# Patient Record
Sex: MALE | Race: WHITE | NOT HISPANIC OR LATINO | Employment: UNEMPLOYED | URBAN - METROPOLITAN AREA
[De-identification: names, ages, dates, MRNs, and addresses within clinical notes are randomized per-mention and may not be internally consistent; named-entity substitution may affect disease eponyms.]

---

## 2022-08-03 ENCOUNTER — TRANSFERRED RECORDS (OUTPATIENT)
Dept: HEALTH INFORMATION MANAGEMENT | Facility: CLINIC | Age: 32
End: 2022-08-03

## 2022-08-03 ENCOUNTER — MEDICAL CORRESPONDENCE (OUTPATIENT)
Dept: HEALTH INFORMATION MANAGEMENT | Facility: CLINIC | Age: 32
End: 2022-08-03

## 2022-08-08 ENCOUNTER — TELEPHONE (OUTPATIENT)
Dept: OPHTHALMOLOGY | Facility: CLINIC | Age: 32
End: 2022-08-08

## 2022-08-08 ENCOUNTER — TRANSCRIBE ORDERS (OUTPATIENT)
Dept: OTHER | Age: 32
End: 2022-08-08

## 2022-08-08 DIAGNOSIS — H53.19 OTHER SUBJECTIVE VISUAL DISTURBANCES: Primary | ICD-10-CM

## 2022-08-08 NOTE — TELEPHONE ENCOUNTER
Called and spoke to Vianey     Made him an appointment with Dr. Francis staffed with Dr. Pickett on 8/23 @ 1030 am     Bernadette Mead Communication Facilitator on 8/8/2022 at 3:08 PM

## 2022-08-22 ENCOUNTER — TELEPHONE (OUTPATIENT)
Dept: OPHTHALMOLOGY | Facility: CLINIC | Age: 32
End: 2022-08-22

## 2022-12-20 ENCOUNTER — TRANSFERRED RECORDS (OUTPATIENT)
Dept: HEALTH INFORMATION MANAGEMENT | Facility: CLINIC | Age: 32
End: 2022-12-20

## 2022-12-21 ENCOUNTER — OFFICE VISIT (OUTPATIENT)
Dept: OPHTHALMOLOGY | Facility: CLINIC | Age: 32
End: 2022-12-21
Attending: OPHTHALMOLOGY
Payer: COMMERCIAL

## 2022-12-21 DIAGNOSIS — Z87.898 H/O DIZZINESS: ICD-10-CM

## 2022-12-21 DIAGNOSIS — H53.19 OTHER SUBJECTIVE VISUAL DISTURBANCES: Primary | ICD-10-CM

## 2022-12-21 DIAGNOSIS — H52.203 MYOPIA OF BOTH EYES WITH ASTIGMATISM: ICD-10-CM

## 2022-12-21 DIAGNOSIS — F10.10 ALCOHOL ABUSE: ICD-10-CM

## 2022-12-21 DIAGNOSIS — H53.50 COLOR BLIND: ICD-10-CM

## 2022-12-21 DIAGNOSIS — H52.13 MYOPIA OF BOTH EYES WITH ASTIGMATISM: ICD-10-CM

## 2022-12-21 PROCEDURE — 92133 CPTRZD OPH DX IMG PST SGM ON: CPT | Performed by: OPHTHALMOLOGY

## 2022-12-21 PROCEDURE — G0463 HOSPITAL OUTPT CLINIC VISIT: HCPCS

## 2022-12-21 PROCEDURE — 92134 CPTRZ OPH DX IMG PST SGM RTA: CPT | Performed by: OPHTHALMOLOGY

## 2022-12-21 PROCEDURE — 92083 EXTENDED VISUAL FIELD XM: CPT | Performed by: OPHTHALMOLOGY

## 2022-12-21 PROCEDURE — 99204 OFFICE O/P NEW MOD 45 MIN: CPT | Performed by: OPHTHALMOLOGY

## 2022-12-21 RX ORDER — LORAZEPAM 1 MG/1
1 TABLET ORAL DAILY PRN
Status: ON HOLD | COMMUNITY
Start: 2022-10-07 | End: 2023-03-16

## 2022-12-21 RX ORDER — SENNOSIDES A AND B 8.6 MG/1
8.6 TABLET, FILM COATED ORAL PRN
COMMUNITY
Start: 2022-12-11 | End: 2023-03-12

## 2022-12-21 RX ORDER — FOLIC ACID 1 MG/1
1 TABLET ORAL DAILY
Qty: 30 TABLET | Refills: 11 | Status: ON HOLD | OUTPATIENT
Start: 2022-12-21 | End: 2023-03-16

## 2022-12-21 RX ORDER — FAMOTIDINE 20 MG/1
20 TABLET, FILM COATED ORAL DAILY
COMMUNITY
Start: 2022-12-11 | End: 2024-08-14

## 2022-12-21 RX ORDER — LANOLIN ALCOHOL/MO/W.PET/CERES
1 CREAM (GRAM) TOPICAL AT BEDTIME
COMMUNITY
Start: 2022-12-11 | End: 2023-03-12

## 2022-12-21 RX ORDER — FLUOXETINE 40 MG/1
40 CAPSULE ORAL DAILY
COMMUNITY
Start: 2022-12-11 | End: 2024-08-14

## 2022-12-21 RX ORDER — LANOLIN ALCOHOL/MO/W.PET/CERES
100 CREAM (GRAM) TOPICAL DAILY
Status: ON HOLD | COMMUNITY
Start: 2022-12-12 | End: 2023-03-16

## 2022-12-21 ASSESSMENT — SLIT LAMP EXAM - LIDS
COMMENTS: NORMAL
COMMENTS: NORMAL

## 2022-12-21 ASSESSMENT — VISUAL ACUITY
OS_CC: J2
OD_PH_CC: 20/50
OS_CC+: -1
OD_CC+: -1
OD_PH_CC+: +2
OD_CC: 20/50
OS_CC: 20/40
OD_CC: J2
METHOD: SNELLEN - LINEAR

## 2022-12-21 ASSESSMENT — REFRACTION_MANIFEST
OS_AXIS: 180
OD_AXIS: 014
OS_SPHERE: -4.75
OD_SPHERE: -4.25
OS_CYLINDER: +1.75
OD_CYLINDER: +1.25

## 2022-12-21 ASSESSMENT — CONF VISUAL FIELD
OS_INFERIOR_TEMPORAL_RESTRICTION: 0
METHOD: COUNTING FINGERS
OD_INFERIOR_TEMPORAL_RESTRICTION: 0
OS_NORMAL: 1
OS_INFERIOR_NASAL_RESTRICTION: 0
OD_SUPERIOR_NASAL_RESTRICTION: 0
OS_SUPERIOR_TEMPORAL_RESTRICTION: 0
OD_SUPERIOR_TEMPORAL_RESTRICTION: 0
OD_NORMAL: 1
OD_INFERIOR_NASAL_RESTRICTION: 0
OS_SUPERIOR_NASAL_RESTRICTION: 0

## 2022-12-21 ASSESSMENT — REFRACTION_WEARINGRX
OS_SPHERE: -5.50
SPECS_TYPE: SVL
OS_AXIS: 180
OS_CYLINDER: +2.00
OD_AXIS: 018
OD_SPHERE: -4.50
OD_CYLINDER: +1.25

## 2022-12-21 ASSESSMENT — TONOMETRY
OS_IOP_MMHG: 15
OD_IOP_MMHG: 17
IOP_METHOD: TONOPEN

## 2022-12-21 ASSESSMENT — CUP TO DISC RATIO
OD_RATIO: 0.35
OS_RATIO: 0.5

## 2022-12-21 NOTE — PROGRESS NOTES
"HPI     Vision Changes Ou    In both eyes.  Onset was gradual.  Severity is mild.  Occurring constantly.  Associated symptoms include dryness and photophobia.  Negative for eye pain, tearing, flashes, floaters, itching and burning.  Pain was noted as 0/10.           Comments    Vianey is here referred by Dr Anna for subjective field disturbance. Vianey states his vision is blurry all the time, but sometimes clears some. He says this started last summer.     Mann Pinead COT 2:13 PM December 21, 2022     Glasses help vision but noticing vision \"coming in and out of focus\".  He states that overall his vision is decreasing. He has had issues with balance for approximately 2 years.    He states that he has intermittent inability to raise his arms.    He has a history of anxiety. Has referral for psychiatrist.     Last EtOH prior to 11/30. Prior to admission. 2 alcoholic drinks per day-liquor or beer. Not homemade.     No restrictive diet.   Denies spots in vision.     Taking thiamine supplements since admission.               Last edited by Juan Luis Lang MD on 12/21/2022  2:52 PM.         Review of systems for the eyes was negative other than the pertinent positives/negatives listed in the HPI.      Assessment & Plan    HPI:  Vianey Whitaker is a 32 year old male with history of EtOH abuse and withdrawal seizures with recent inpatient hospitalization at [facility] [place] 11/30/22-12/11/22, myopia with astigmatism presents with 6 month history of decreased vision from Dr. Jesús Anna O.D. at [facility]. Initially noted decreased vision in August and was referred to [facility] Best corrected visual acuity at that time 20/50 right eye, 20/40 left eye.  He has noted 2 years of balance issues with intermittent inability to raise his arms. He denies a restrictive diet. Denies diplopia. Denies TVOs.     Since recent hospitalization, he is taking thiamine 100mg.      POHx: myopia with astigmatism and presbyopia  PMHx: EtOH abuse " with withdrawal seizure  Current Medications: famotidine (PEPCID) 20 MG tablet, Take 20 mg by mouth daily  FLUoxetine (PROZAC) 20 MG capsule, Take 1 capsule by mouth daily  LORazepam (ATIVAN) 1 MG tablet, Take 1 tablet by mouth daily as needed  melatonin 3 MG tablet, Take 1 tablet by mouth At Bedtime  senna (SENOKOT) 8.6 MG tablet, Take 8.6 mg by mouth as needed  thiamine (B-1) 100 MG tablet, Take 100 mg by mouth daily    No current facility-administered medications on file prior to visit.    FHx: denies family history of ocular conditions  PSHx: denies history of ocular surgeries     CT Head 11/30/22 [facility] [place]  Findings: Small benign sebaceous cyst is seen overlying the right frontal scalp. There is no evidence of intracranial hemorrhage, mass effect, midline shift or abnormal extraaxial fluid collection. The ventricles do not appear enlarged out of proportion to the cerebral sulci. Gray-white differentiation is intact throughout both cerebral hemispheres. The orbits appear unremarkable. Moderate cerebellar volume loss. Diffuse cerebral volume loss. Low-attenuation the ventral sy, could be related to chronic small vessel ischemic disease, however it is partially obscured by significant artifact.     The bony calvarium and the bones of the skull base appear normal. The visualized portions of the paranasal sinuses and the mastoid air cells are clear. Chronic nasal septal deformity.    Folate serum 2/17/22 3.1 (LOW)  Vitamin B12 2/17/22 1118 (HIGH)    Current Eye Medications:      Assessment & Plan:  (H53.19) Other subjective visual disturbances  (primary encounter diagnosis)  (F10.10) Alcohol abuse  Best corrected visual acuity 20/40 consistent with [place] best corrected visual acuity from 6/2022, consistent with 20/50+ right eye, 20/40 left eye today    History of withdrawal seizure and hospital admission at [facility] [place] 11/30/2022-12/11/22  CT head at the time normal  History of low folic acid 2/2022  Pentacam with mildly  irregular astigmatism both eyes but not blatant Keratoconus or PMD  OCT with temporal thinning each eye consistent with possible nutritional deficiency    Will order MRI Brain/orbits with and without contrast; optic neuropathy labs including thiamine, B12/folate, FTA/RPR, quantiferon, heavy metal screen; can consider further work up thereafter  - will refer to neuro-ophthalmology for further eval after testing completed    - will supplement with 1mg folic acid  - continue thiamine supplementation 100mg daily    H/O dizziness  With cerebellar volume loss on CT  Will refer to neurology    (H53.50) Color blind  Color plates with 1/14 right eye, 0/14 left eye   Patient should be able to complete baseline color plate left eye with vision and color blind    (H52.13,  H52.203) Myopia of both eyes with astigmatism  MRx today similar     Return for neuro op 1-2 months.        Juan Luis Lang MD     Attending Physician Attestation:  Complete documentation of historical and exam elements from today's encounter can be found in the full encounter summary report (not reduplicated in this progress note).  I personally obtained the chief complaint(s) and history of present illness.  I confirmed and edited as necessary the review of systems, past medical/surgical history, family history, social history, and examination findings as documented by others; and I examined the patient myself.  I personally reviewed the relevant tests, images, and reports as documented above.  I formulated and edited as necessary the assessment and plan and discussed the findings and management plan with the patient and family. - Juan Luis Lang MD

## 2022-12-21 NOTE — NURSING NOTE
Chief Complaints and History of Present Illnesses   Patient presents with     Vision Changes Ou     Chief Complaint(s) and History of Present Illness(es)     Vision Changes Ou            Laterality: both eyes    Onset: gradual    Severity: mild    Frequency: constantly    Associated symptoms: dryness and photophobia.  Negative for eye pain, tearing, flashes, floaters, itching and burning    Pain scale: 0/10          Comments    Vianey is here referred by Dr Anna for subjective field disturbance. Vianey states his vision is blurry all the time, but sometimes clears some. He says this started last summer.    Mann Pineda COT 2:13 PM December 21, 2022

## 2022-12-26 ENCOUNTER — LAB (OUTPATIENT)
Dept: LAB | Facility: CLINIC | Age: 32
End: 2022-12-26
Payer: COMMERCIAL

## 2022-12-26 DIAGNOSIS — H53.19 OTHER SUBJECTIVE VISUAL DISTURBANCES: ICD-10-CM

## 2022-12-26 DIAGNOSIS — F10.10 ALCOHOL ABUSE: ICD-10-CM

## 2022-12-26 LAB — T PALLIDUM AB SER QL: NONREACTIVE

## 2022-12-26 PROCEDURE — 86780 TREPONEMA PALLIDUM: CPT | Mod: 90 | Performed by: PATHOLOGY

## 2022-12-26 PROCEDURE — 84425 ASSAY OF VITAMIN B-1: CPT | Mod: 90 | Performed by: PATHOLOGY

## 2022-12-26 PROCEDURE — 99000 SPECIMEN HANDLING OFFICE-LAB: CPT | Performed by: PATHOLOGY

## 2022-12-26 PROCEDURE — 36415 COLL VENOUS BLD VENIPUNCTURE: CPT | Performed by: PATHOLOGY

## 2022-12-26 PROCEDURE — 86481 TB AG RESPONSE T-CELL SUSP: CPT | Mod: 90 | Performed by: PATHOLOGY

## 2022-12-27 LAB
GAMMA INTERFERON BACKGROUND BLD IA-ACNC: 0.03 IU/ML
M TB IFN-G BLD-IMP: NEGATIVE
M TB IFN-G CD4+ BCKGRND COR BLD-ACNC: 9.97 IU/ML
MITOGEN IGNF BCKGRD COR BLD-ACNC: -0.01 IU/ML
MITOGEN IGNF BCKGRD COR BLD-ACNC: -0.01 IU/ML
QUANTIFERON MITOGEN: 10 IU/ML
QUANTIFERON NIL TUBE: 0.03 IU/ML
QUANTIFERON TB1 TUBE: 0.02 IU/ML
QUANTIFERON TB2 TUBE: 0.02

## 2023-01-02 LAB — VIT B1 PYROPHOSHATE BLD-SCNC: 200 NMOL/L

## 2023-01-03 ENCOUNTER — TRANSCRIBE ORDERS (OUTPATIENT)
Dept: OTHER | Age: 33
End: 2023-01-03

## 2023-01-03 DIAGNOSIS — J69.0 ASPIRATION PNEUMONIA (H): ICD-10-CM

## 2023-01-03 DIAGNOSIS — Z09 HOSPITAL DISCHARGE FOLLOW-UP: ICD-10-CM

## 2023-01-03 DIAGNOSIS — J02.9 PHARYNGITIS: ICD-10-CM

## 2023-01-03 DIAGNOSIS — R13.10 DYSPHAGIA: Primary | ICD-10-CM

## 2023-03-12 ENCOUNTER — APPOINTMENT (OUTPATIENT)
Dept: CT IMAGING | Facility: CLINIC | Age: 33
End: 2023-03-12
Attending: PHYSICIAN ASSISTANT
Payer: COMMERCIAL

## 2023-03-12 ENCOUNTER — HOSPITAL ENCOUNTER (INPATIENT)
Facility: CLINIC | Age: 33
LOS: 4 days | Discharge: HOME | End: 2023-03-16
Attending: EMERGENCY MEDICINE | Admitting: INTERNAL MEDICINE
Payer: COMMERCIAL

## 2023-03-12 DIAGNOSIS — F10.939 ALCOHOL WITHDRAWAL SYNDROME WITH COMPLICATION (H): ICD-10-CM

## 2023-03-12 DIAGNOSIS — R56.9 SEIZURES (H): ICD-10-CM

## 2023-03-12 DIAGNOSIS — F32.A DEPRESSION, UNSPECIFIED DEPRESSION TYPE: ICD-10-CM

## 2023-03-12 DIAGNOSIS — R74.01 TRANSAMINITIS: Primary | ICD-10-CM

## 2023-03-12 LAB
ALBUMIN SERPL BCG-MCNC: 4.2 G/DL (ref 3.5–5.2)
ALP SERPL-CCNC: 63 U/L (ref 40–129)
ALT SERPL W P-5'-P-CCNC: 75 U/L (ref 10–50)
ANION GAP SERPL CALCULATED.3IONS-SCNC: 14 MMOL/L (ref 7–15)
AST SERPL W P-5'-P-CCNC: 127 U/L (ref 10–50)
BASOPHILS # BLD AUTO: 0.1 10E3/UL (ref 0–0.2)
BASOPHILS NFR BLD AUTO: 1 %
BILIRUB SERPL-MCNC: 1.4 MG/DL
BUN SERPL-MCNC: 6.8 MG/DL (ref 6–20)
CALCIUM SERPL-MCNC: 9.3 MG/DL (ref 8.6–10)
CHLORIDE SERPL-SCNC: 94 MMOL/L (ref 98–107)
CREAT SERPL-MCNC: 0.62 MG/DL (ref 0.67–1.17)
DEPRECATED HCO3 PLAS-SCNC: 27 MMOL/L (ref 22–29)
EOSINOPHIL # BLD AUTO: 0 10E3/UL (ref 0–0.7)
EOSINOPHIL NFR BLD AUTO: 0 %
ERYTHROCYTE [DISTWIDTH] IN BLOOD BY AUTOMATED COUNT: 12.3 % (ref 10–15)
GFR SERPL CREATININE-BSD FRML MDRD: >90 ML/MIN/1.73M2
GLUCOSE SERPL-MCNC: 186 MG/DL (ref 70–99)
HCT VFR BLD AUTO: 40 % (ref 40–53)
HGB BLD-MCNC: 13.9 G/DL (ref 13.3–17.7)
HOLD SPECIMEN: NORMAL
HOLD SPECIMEN: NORMAL
IMM GRANULOCYTES # BLD: 0 10E3/UL
IMM GRANULOCYTES NFR BLD: 1 %
INR PPP: 1.09 (ref 0.85–1.15)
LYMPHOCYTES # BLD AUTO: 0.2 10E3/UL (ref 0.8–5.3)
LYMPHOCYTES NFR BLD AUTO: 4 %
MAGNESIUM SERPL-MCNC: 1.2 MG/DL (ref 1.7–2.3)
MCH RBC QN AUTO: 31.4 PG (ref 26.5–33)
MCHC RBC AUTO-ENTMCNC: 34.8 G/DL (ref 31.5–36.5)
MCV RBC AUTO: 91 FL (ref 78–100)
MONOCYTES # BLD AUTO: 0.5 10E3/UL (ref 0–1.3)
MONOCYTES NFR BLD AUTO: 11 %
NEUTROPHILS # BLD AUTO: 3.5 10E3/UL (ref 1.6–8.3)
NEUTROPHILS NFR BLD AUTO: 83 %
NRBC # BLD AUTO: 0 10E3/UL
NRBC BLD AUTO-RTO: 0 /100
PHOSPHATE SERPL-MCNC: 1.3 MG/DL (ref 2.5–4.5)
PLATELET # BLD AUTO: 183 10E3/UL (ref 150–450)
POTASSIUM SERPL-SCNC: 3.2 MMOL/L (ref 3.4–5.3)
PROT SERPL-MCNC: 6.8 G/DL (ref 6.4–8.3)
RBC # BLD AUTO: 4.42 10E6/UL (ref 4.4–5.9)
SODIUM SERPL-SCNC: 135 MMOL/L (ref 136–145)
WBC # BLD AUTO: 4.3 10E3/UL (ref 4–11)

## 2023-03-12 PROCEDURE — 70450 CT HEAD/BRAIN W/O DYE: CPT

## 2023-03-12 PROCEDURE — 258N000003 HC RX IP 258 OP 636: Performed by: EMERGENCY MEDICINE

## 2023-03-12 PROCEDURE — 36415 COLL VENOUS BLD VENIPUNCTURE: CPT | Performed by: INTERNAL MEDICINE

## 2023-03-12 PROCEDURE — HZ2ZZZZ DETOXIFICATION SERVICES FOR SUBSTANCE ABUSE TREATMENT: ICD-10-PCS | Performed by: INTERNAL MEDICINE

## 2023-03-12 PROCEDURE — 250N000011 HC RX IP 250 OP 636: Performed by: EMERGENCY MEDICINE

## 2023-03-12 PROCEDURE — 84100 ASSAY OF PHOSPHORUS: CPT | Performed by: INTERNAL MEDICINE

## 2023-03-12 PROCEDURE — 83735 ASSAY OF MAGNESIUM: CPT | Performed by: PHYSICIAN ASSISTANT

## 2023-03-12 PROCEDURE — 85610 PROTHROMBIN TIME: CPT | Performed by: INTERNAL MEDICINE

## 2023-03-12 PROCEDURE — 93010 ELECTROCARDIOGRAM REPORT: CPT | Performed by: EMERGENCY MEDICINE

## 2023-03-12 PROCEDURE — 99285 EMERGENCY DEPT VISIT HI MDM: CPT | Mod: 25 | Performed by: EMERGENCY MEDICINE

## 2023-03-12 PROCEDURE — 250N000013 HC RX MED GY IP 250 OP 250 PS 637: Performed by: INTERNAL MEDICINE

## 2023-03-12 PROCEDURE — 250N000013 HC RX MED GY IP 250 OP 250 PS 637: Performed by: EMERGENCY MEDICINE

## 2023-03-12 PROCEDURE — 80053 COMPREHEN METABOLIC PANEL: CPT | Performed by: PHYSICIAN ASSISTANT

## 2023-03-12 PROCEDURE — 93005 ELECTROCARDIOGRAM TRACING: CPT | Performed by: EMERGENCY MEDICINE

## 2023-03-12 PROCEDURE — 36415 COLL VENOUS BLD VENIPUNCTURE: CPT | Performed by: EMERGENCY MEDICINE

## 2023-03-12 PROCEDURE — 250N000011 HC RX IP 250 OP 636: Performed by: INTERNAL MEDICINE

## 2023-03-12 PROCEDURE — 99223 1ST HOSP IP/OBS HIGH 75: CPT | Mod: AI | Performed by: INTERNAL MEDICINE

## 2023-03-12 PROCEDURE — 96361 HYDRATE IV INFUSION ADD-ON: CPT | Performed by: EMERGENCY MEDICINE

## 2023-03-12 PROCEDURE — 72125 CT NECK SPINE W/O DYE: CPT

## 2023-03-12 PROCEDURE — 258N000003 HC RX IP 258 OP 636: Performed by: INTERNAL MEDICINE

## 2023-03-12 PROCEDURE — 85025 COMPLETE CBC W/AUTO DIFF WBC: CPT | Performed by: EMERGENCY MEDICINE

## 2023-03-12 PROCEDURE — 120N000002 HC R&B MED SURG/OB UMMC

## 2023-03-12 PROCEDURE — 96376 TX/PRO/DX INJ SAME DRUG ADON: CPT | Performed by: EMERGENCY MEDICINE

## 2023-03-12 PROCEDURE — 96374 THER/PROPH/DIAG INJ IV PUSH: CPT | Performed by: EMERGENCY MEDICINE

## 2023-03-12 RX ORDER — LORAZEPAM 2 MG/ML
1 INJECTION INTRAMUSCULAR ONCE
Status: COMPLETED | OUTPATIENT
Start: 2023-03-12 | End: 2023-03-12

## 2023-03-12 RX ORDER — MAGNESIUM OXIDE 400 MG/1
400 TABLET ORAL DAILY
Status: DISCONTINUED | OUTPATIENT
Start: 2023-03-12 | End: 2023-03-12

## 2023-03-12 RX ORDER — MAGNESIUM SULFATE HEPTAHYDRATE 40 MG/ML
2 INJECTION, SOLUTION INTRAVENOUS ONCE
Status: COMPLETED | OUTPATIENT
Start: 2023-03-12 | End: 2023-03-12

## 2023-03-12 RX ORDER — HALOPERIDOL 5 MG/ML
2.5-5 INJECTION INTRAMUSCULAR EVERY 6 HOURS PRN
Status: DISCONTINUED | OUTPATIENT
Start: 2023-03-12 | End: 2023-03-16 | Stop reason: HOSPADM

## 2023-03-12 RX ORDER — MAGNESIUM OXIDE 400 MG/1
400 TABLET ORAL 2 TIMES DAILY
Status: DISCONTINUED | OUTPATIENT
Start: 2023-03-12 | End: 2023-03-16 | Stop reason: HOSPADM

## 2023-03-12 RX ORDER — GABAPENTIN 300 MG/1
300 CAPSULE ORAL EVERY 8 HOURS
Status: DISCONTINUED | OUTPATIENT
Start: 2023-03-17 | End: 2023-03-16 | Stop reason: HOSPADM

## 2023-03-12 RX ORDER — LORAZEPAM 2 MG/ML
2 INJECTION INTRAMUSCULAR ONCE
Status: COMPLETED | OUTPATIENT
Start: 2023-03-12 | End: 2023-03-12

## 2023-03-12 RX ORDER — DIAZEPAM 10 MG
10 TABLET ORAL EVERY 30 MIN PRN
Status: DISCONTINUED | OUTPATIENT
Start: 2023-03-12 | End: 2023-03-16 | Stop reason: HOSPADM

## 2023-03-12 RX ORDER — DIAZEPAM 5 MG
5-20 TABLET ORAL EVERY 30 MIN PRN
Status: DISCONTINUED | OUTPATIENT
Start: 2023-03-12 | End: 2023-03-12

## 2023-03-12 RX ORDER — FOLIC ACID 1 MG/1
1 TABLET ORAL DAILY
Status: DISCONTINUED | OUTPATIENT
Start: 2023-03-13 | End: 2023-03-16 | Stop reason: HOSPADM

## 2023-03-12 RX ORDER — POTASSIUM CHLORIDE 1.5 G/1.58G
40 POWDER, FOR SOLUTION ORAL ONCE
Status: COMPLETED | OUTPATIENT
Start: 2023-03-12 | End: 2023-03-12

## 2023-03-12 RX ORDER — MULTIPLE VITAMINS W/ MINERALS TAB 9MG-400MCG
1 TAB ORAL DAILY
Status: DISCONTINUED | OUTPATIENT
Start: 2023-03-12 | End: 2023-03-16 | Stop reason: HOSPADM

## 2023-03-12 RX ORDER — LOPERAMIDE HCL 2 MG
2 CAPSULE ORAL 4 TIMES DAILY PRN
Status: DISCONTINUED | OUTPATIENT
Start: 2023-03-12 | End: 2023-03-16 | Stop reason: HOSPADM

## 2023-03-12 RX ORDER — POTASSIUM CHLORIDE 750 MG/1
40 TABLET, EXTENDED RELEASE ORAL 3 TIMES DAILY
Status: COMPLETED | OUTPATIENT
Start: 2023-03-12 | End: 2023-03-13

## 2023-03-12 RX ORDER — GABAPENTIN 300 MG/1
600 CAPSULE ORAL EVERY 8 HOURS
Status: DISCONTINUED | OUTPATIENT
Start: 2023-03-15 | End: 2023-03-16 | Stop reason: HOSPADM

## 2023-03-12 RX ORDER — GABAPENTIN 600 MG/1
1200 TABLET ORAL ONCE
Status: COMPLETED | OUTPATIENT
Start: 2023-03-12 | End: 2023-03-12

## 2023-03-12 RX ORDER — NALTREXONE HYDROCHLORIDE 50 MG/1
50 TABLET, FILM COATED ORAL DAILY
Status: ON HOLD | COMMUNITY
End: 2023-03-16

## 2023-03-12 RX ORDER — DIAZEPAM 10 MG/2ML
5-10 INJECTION, SOLUTION INTRAMUSCULAR; INTRAVENOUS EVERY 30 MIN PRN
Status: DISCONTINUED | OUTPATIENT
Start: 2023-03-12 | End: 2023-03-16 | Stop reason: HOSPADM

## 2023-03-12 RX ORDER — FLUMAZENIL 0.1 MG/ML
0.2 INJECTION, SOLUTION INTRAVENOUS
Status: DISCONTINUED | OUTPATIENT
Start: 2023-03-12 | End: 2023-03-16 | Stop reason: HOSPADM

## 2023-03-12 RX ORDER — CLONIDINE HYDROCHLORIDE 0.1 MG/1
0.1 TABLET ORAL EVERY 8 HOURS
Status: DISCONTINUED | OUTPATIENT
Start: 2023-03-12 | End: 2023-03-16 | Stop reason: HOSPADM

## 2023-03-12 RX ORDER — SODIUM CHLORIDE 9 MG/ML
INJECTION, SOLUTION INTRAVENOUS
Status: COMPLETED
Start: 2023-03-12 | End: 2023-03-12

## 2023-03-12 RX ORDER — GABAPENTIN 300 MG/1
900 CAPSULE ORAL EVERY 8 HOURS
Status: COMPLETED | OUTPATIENT
Start: 2023-03-12 | End: 2023-03-15

## 2023-03-12 RX ORDER — GABAPENTIN 100 MG/1
100 CAPSULE ORAL EVERY 8 HOURS
Status: DISCONTINUED | OUTPATIENT
Start: 2023-03-19 | End: 2023-03-16 | Stop reason: HOSPADM

## 2023-03-12 RX ORDER — SODIUM CHLORIDE 9 MG/ML
INJECTION, SOLUTION INTRAVENOUS CONTINUOUS
Status: DISCONTINUED | OUTPATIENT
Start: 2023-03-12 | End: 2023-03-14

## 2023-03-12 RX ORDER — OLANZAPINE 5 MG/1
5-10 TABLET, ORALLY DISINTEGRATING ORAL EVERY 6 HOURS PRN
Status: DISCONTINUED | OUTPATIENT
Start: 2023-03-12 | End: 2023-03-16 | Stop reason: HOSPADM

## 2023-03-12 RX ADMIN — POTASSIUM CHLORIDE 40 MEQ: 1.5 POWDER, FOR SOLUTION ORAL at 15:36

## 2023-03-12 RX ADMIN — FOLIC ACID: 5 INJECTION, SOLUTION INTRAMUSCULAR; INTRAVENOUS; SUBCUTANEOUS at 15:56

## 2023-03-12 RX ADMIN — LORAZEPAM 1 MG: 2 INJECTION INTRAMUSCULAR at 14:53

## 2023-03-12 RX ADMIN — LORAZEPAM 2 MG: 2 INJECTION INTRAMUSCULAR at 13:55

## 2023-03-12 RX ADMIN — SODIUM CHLORIDE 1000 ML: 9 INJECTION, SOLUTION INTRAVENOUS at 13:55

## 2023-03-12 RX ADMIN — GABAPENTIN 1200 MG: 600 TABLET, FILM COATED ORAL at 15:36

## 2023-03-12 RX ADMIN — CLONIDINE HYDROCHLORIDE 0.1 MG: 0.1 TABLET ORAL at 23:48

## 2023-03-12 RX ADMIN — GABAPENTIN 900 MG: 300 CAPSULE ORAL at 23:48

## 2023-03-12 RX ADMIN — SODIUM CHLORIDE, PRESERVATIVE FREE: 5 INJECTION INTRAVENOUS at 21:19

## 2023-03-12 RX ADMIN — MULTIPLE VITAMINS W/ MINERALS TAB 1 TABLET: TAB at 16:30

## 2023-03-12 RX ADMIN — CLONIDINE HYDROCHLORIDE 0.1 MG: 0.1 TABLET ORAL at 16:30

## 2023-03-12 RX ADMIN — DIAZEPAM 10 MG: 10 TABLET ORAL at 17:19

## 2023-03-12 RX ADMIN — MAGNESIUM OXIDE TAB 400 MG (241.3 MG ELEMENTAL MG) 400 MG: 400 (241.3 MG) TAB at 20:02

## 2023-03-12 RX ADMIN — MAGNESIUM OXIDE TAB 400 MG (241.3 MG ELEMENTAL MG) 400 MG: 400 (241.3 MG) TAB at 17:21

## 2023-03-12 RX ADMIN — DIBASIC SODIUM PHOSPHATE, MONOBASIC POTASSIUM PHOSPHATE AND MONOBASIC SODIUM PHOSPHATE 250 MG: 852; 155; 130 TABLET ORAL at 20:03

## 2023-03-12 RX ADMIN — MAGNESIUM SULFATE 2 G: 2 INJECTION INTRAVENOUS at 15:58

## 2023-03-12 RX ADMIN — POTASSIUM CHLORIDE 40 MEQ: 750 TABLET, EXTENDED RELEASE ORAL at 20:02

## 2023-03-12 RX ADMIN — DIAZEPAM 10 MG: 5 TABLET ORAL at 13:42

## 2023-03-12 ASSESSMENT — ACTIVITIES OF DAILY LIVING (ADL)
DRESSING/BATHING_DIFFICULTY: YES
NUMBER_OF_TIMES_PATIENT_HAS_FALLEN_WITHIN_LAST_SIX_MONTHS: 4
TRANSFERRING: 0-->ASSISTANCE NEEDED (DEVELOPMENTALLY APPROPRIATE)
TRANSFERRING: 1-->ASSISTANCE (EQUIPMENT/PERSON) NEEDED
BATHING: 1-->ASSISTANCE NEEDED
FALL_HISTORY_WITHIN_LAST_SIX_MONTHS: YES
TOILETING_ISSUES: YES
ADLS_ACUITY_SCORE: 36
WEAR_GLASSES_OR_BLIND: YES
ADLS_ACUITY_SCORE: 35
DOING_ERRANDS_INDEPENDENTLY_DIFFICULTY: NO
CONCENTRATING,_REMEMBERING_OR_MAKING_DECISIONS_DIFFICULTY: YES
EQUIPMENT_CURRENTLY_USED_AT_HOME: WALKER, STANDARD
DRESS: 0-->ASSISTANCE NEEDED (DEVELOPMENTALLY APPROPRIATE)
ADLS_ACUITY_SCORE: 36
VISION_MANAGEMENT: PRESCRIPTION GLASSES
TOILETING: 0-->NOT TOILET TRAINED OR ASSISTANCE NEEDED (DEVELOPMENTALLY APPROPRIATE)
CHANGE_IN_FUNCTIONAL_STATUS_SINCE_ONSET_OF_CURRENT_ILLNESS/INJURY: YES
ADLS_ACUITY_SCORE: 34
ADLS_ACUITY_SCORE: 35
WALKING_OR_CLIMBING_STAIRS_DIFFICULTY: YES
DRESSING/BATHING: BATHING DIFFICULTY, ASSISTANCE 1 PERSON
DIFFICULTY_EATING/SWALLOWING: NO
TOILETING: 1-->ASSISTANCE (EQUIPMENT/PERSON) NEEDED
DRESS: 1-->ASSISTANCE (EQUIPMENT/PERSON) NEEDED

## 2023-03-12 NOTE — PHARMACY-ADMISSION MEDICATION HISTORY
"Admission medication history interview status for the 3/12/2023 admission is complete. See EPIC admission navigator for allergy information, pharmacy, prior to admission medications and immunization status.     Medication history interview sources:  patient, fill history    Changes made to PTA medication list (reason)  Added: naltrexone  Deleted: melatonin, senna  Changed: fluoxetine 20mg--40mg    Additional medication history information (including reliability of information, actions taken by pharmacist): Aside from famotidine, rest of meds have not been taken since \"last week\".     Medication history completed by: Melissa Berry, PharmD, MPH, MS      Prior to Admission medications    Medication Sig Last Dose Taking? Auth Provider Long Term End Date   famotidine (PEPCID) 20 MG tablet Take 20 mg by mouth daily 3/11/2023 Yes Reported, Patient     FLUoxetine (PROZAC) 40 MG capsule Take 40 mg by mouth daily Past Week Yes Reported, Patient Yes    folic acid (FOLVITE) 1 MG tablet Take 1 tablet (1 mg) by mouth daily Past Week Yes Juan Luis Lang MD     LORazepam (ATIVAN) 1 MG tablet Take 1 tablet by mouth daily as needed Past Week Yes Reported, Patient     naltrexone (DEPADE/REVIA) 50 MG tablet Take 50 mg by mouth daily Past Week Yes Unknown, Entered By History Yes    thiamine (B-1) 100 MG tablet Take 100 mg by mouth daily Past Week Yes Reported, Patient            "

## 2023-03-12 NOTE — ED TRIAGE NOTES
Bib ems activated by family member after w/d seizure. Last drink was Thursday night, heavy drinking 2 glasses of scotch x months. Pt reports 1 w/d seizure last year. Seizure witnessed by sister, reported at least one minute. Pt did not hit head, was in bed. He bit tongue, blood noted on bottom lip. No bowel/bladder incontinence. Pt reports seizures have been going on since last night. Sister witnessed this one prior to admission. Reports vomiting x 3 days. Received 2mg of versed in ambulance. Denies hallucination.      Triage Assessment     Row Name 03/12/23 0371       Triage Assessment (Adult)    Airway WDL WDL       Respiratory WDL    Respiratory WDL WDL       Skin Circulation/Temperature WDL    Skin Circulation/Temperature WDL WDL       Cardiac WDL    Cardiac WDL WDL       Peripheral/Neurovascular WDL    Peripheral Neurovascular WDL WDL       Cognitive/Neuro/Behavioral WDL    Cognitive/Neuro/Behavioral WDL WDL

## 2023-03-12 NOTE — ED PROVIDER NOTES
ED Provider Note  [facility]      History     Chief Complaint   Patient presents with     Alcohol Intoxication     Seizures     HPI  Vianey Whitaker is a 32 year old male past medical history significant for alcohol use with history of withdrawal, DTs and withdrawal seizure who presents to the emergency department tonight with his sister with concerns for alcohol withdrawal and seizures.    Patient presents via EMS, where he was given 2 mg Versed in route.  Patient tells me that he typically drinks 2 full glasses of scotch a day, his last drink was either Thursday night or Friday morning, he does not recall.  He states that since, he has been feeling ill with associated vomiting, and reported seizures over the weekend.  Patient has been staying at his dad's place, alone, states he does not recall falling although is unsure as he states he thinks he has had multiple seizures.  He was seen by his sister today who noted that patient had a witnessed seizure, lasting about 2 to 3 minutes before resolving.  Patient did bite his tongue during the seizure.  Patient has complicated history of alcohol withdrawal seizures in the past, and has been admitted in in the past intubated due to him having difficulty protecting his airway.  Patient states that he is agreeable to admission.  He states he does not use any other drugs.  He reports passive suicidal thoughts in the past without any recent thoughts of harming himself or others, he has no plan and has not attempted suicide in the past.  He denies any visual or auditory hallucinations at this time.  Patient states he feels warm though has no measured fevers at home.  He is having a little sore throat and cough today, without other symptoms.  He is having some generalized abdominal discomfort with his symptoms here as well.  He did have some diarrhea in the last 24 hours without any bloody or black stools, hematemesis, or hemoptysis.  He does report a  headache with his symptoms currently.  No significant neck pain.    Past Medical History  Past Medical History:   Diagnosis Date     Alcohol dependence (H)      Alcohol withdrawal seizure (H)      Anxiety      Major depression      No past surgical history on file.  famotidine (PEPCID) 20 MG tablet  FLUoxetine (PROZAC) 40 MG capsule  folic acid (FOLVITE) 1 MG tablet  LORazepam (ATIVAN) 1 MG tablet  naltrexone (DEPADE/REVIA) 50 MG tablet  thiamine (B-1) 100 MG tablet      Allergies   Allergen Reactions     Ceftriaxone Shortness Of Breath and Rash     Received first dose of antibiotic on 12/2 and immediately after pt found to be red all over and had spots of macularpapular rash     Family History  No family history on file.  Social History   Social History     Tobacco Use     Smoking status: Never     Smokeless tobacco: Never   Substance Use Topics     Alcohol use: Yes     Alcohol/week: 7.0 - 8.0 standard drinks     Types: 7 - 8 Shots of liquor per week     Comment: 2 full glasses of whiseky every day     Drug use: Never         A medically appropriate review of systems was performed with pertinent positives and negatives noted in the HPI, and all other systems negative.    Physical Exam   BP: 136/88  Pulse: 107  Temp: 98.1  F (36.7  C)  Resp: 18  SpO2: 99 %  Physical Exam    GENERAL APPEARANCE: The patient is well developed, appears disheveled and appears anxious in the gurney.  HEAD:  Normocephalic and atraumatic.   EENT: Voice normal.  NECK: Patient has no tenderness to palpation at midline and C-spine T-spine L-spine.  LUNGS: Breath sounds are equal and clear bilaterally. No wheezes, rhonchi, or rales.  HEART: Tachycardic rate and normal rhythm.    ABDOMEN: Patient is some generalized tenderness throughout without any focal tenderness.  No Kaiser sign.  No McBurney point tenderness.  EXTREMITIES: No cyanosis, clubbing, or edema.  NEUROLOGIC: Patient has visible bilateral tremor on exam.  Cranial nerves II through  XII intact.  Rapid alternating movements, finger-to-nose testing intact.  There is decreased strength with dorsiflexion in the lower extremities bilaterally 4-5 with intact plantarflexion 5 out of 5, and  strength 5 out of 5 bilaterally.  PSYCHIATRIC: The patient is awake, alert.  Appropriate mood and affect.  SKIN: Warm, dry, and well perfused. Good turgor.        ED Course, Procedures, & Data      EKG 3/12/2023: Sinus tachycardia, ventricular rate 102 QTc 445.  When interpreted by myself, I appreciate a regular rhythm.  There is normal R wave progression.  There is visible artifact from tremor, although I do not see any visible ST elevation or depression to suggest ischemia.       Results for orders placed or performed during the hospital encounter of 03/12/23   Head CT w/o contrast     Status: None    Narrative    EXAM: CT HEAD W/O CONTRAST, CT CERVICAL SPINE W/O CONTRAST  LOCATION: [facility]  DATE/TIME: 3/12/2023 3:39 PM    INDICATION: Multiple withdrawal seizures, unwitnessed, evaluate for head injury  COMPARISON: None.  TECHNIQUE:   1) Routine CT Head without IV contrast. Multiplanar reformats. Dose reduction techniques were used.  2) Routine CT Cervical Spine without IV contrast. Multiplanar reformats. Dose reduction techniques were used.    FINDINGS:   HEAD CT:   INTRACRANIAL CONTENTS: No intracranial hemorrhage, extraaxial collection, or mass effect.  The thin hyperattenuation along the anterior right falx on single slice of coronal image only corresponds to a small blood vessel. No CT evidence of acute infarct.   Normal parenchymal attenuation. Normal ventricles and sulci.     VISUALIZED ORBITS/SINUSES/MASTOIDS: No intraorbital abnormality. No paranasal sinus mucosal disease. No middle ear or mastoid effusion.    BONES/SOFT TISSUES: No acute abnormality.    CERVICAL SPINE CT:   VERTEBRA: Normal vertebral body heights and alignment. No fracture or  posttraumatic subluxation.     CANAL/FORAMINA: Early C5-C6 and C6-C7 disc degenerative change. No severe canal or neural foraminal stenosis.    PARASPINAL: No extraspinal abnormality. Visualized lung fields are clear.      Impression    IMPRESSION:  HEAD CT:  1.  No acute intracranial process.    CERVICAL SPINE CT:  1.  No CT evidence for acute fracture or post traumatic subluxation.   Cervical spine CT w/o contrast     Status: None    Narrative    EXAM: CT HEAD W/O CONTRAST, CT CERVICAL SPINE W/O CONTRAST  LOCATION: [facility]  DATE/TIME: 3/12/2023 3:39 PM    INDICATION: Multiple withdrawal seizures, unwitnessed, evaluate for head injury  COMPARISON: None.  TECHNIQUE:   1) Routine CT Head without IV contrast. Multiplanar reformats. Dose reduction techniques were used.  2) Routine CT Cervical Spine without IV contrast. Multiplanar reformats. Dose reduction techniques were used.    FINDINGS:   HEAD CT:   INTRACRANIAL CONTENTS: No intracranial hemorrhage, extraaxial collection, or mass effect.  The thin hyperattenuation along the anterior right falx on single slice of coronal image only corresponds to a small blood vessel. No CT evidence of acute infarct.   Normal parenchymal attenuation. Normal ventricles and sulci.     VISUALIZED ORBITS/SINUSES/MASTOIDS: No intraorbital abnormality. No paranasal sinus mucosal disease. No middle ear or mastoid effusion.    BONES/SOFT TISSUES: No acute abnormality.    CERVICAL SPINE CT:   VERTEBRA: Normal vertebral body heights and alignment. No fracture or posttraumatic subluxation.     CANAL/FORAMINA: Early C5-C6 and C6-C7 disc degenerative change. No severe canal or neural foraminal stenosis.    PARASPINAL: No extraspinal abnormality. Visualized lung fields are clear.      Impression    IMPRESSION:  HEAD CT:  1.  No acute intracranial process.    CERVICAL SPINE CT:  1.  No CT evidence for acute fracture or post traumatic subluxation.    CBC with platelets and differential     Status: Abnormal   Result Value Ref Range    WBC Count 4.3 4.0 - 11.0 10e3/uL    RBC Count 4.42 4.40 - 5.90 10e6/uL    Hemoglobin 13.9 13.3 - 17.7 g/dL    Hematocrit 40.0 40.0 - 53.0 %    MCV 91 78 - 100 fL    MCH 31.4 26.5 - 33.0 pg    MCHC 34.8 31.5 - 36.5 g/dL    RDW 12.3 10.0 - 15.0 %    Platelet Count 183 150 - 450 10e3/uL    % Neutrophils 83 %    % Lymphocytes 4 %    % Monocytes 11 %    % Eosinophils 0 %    % Basophils 1 %    % Immature Granulocytes 1 %    NRBCs per 100 WBC 0 <1 /100    Absolute Neutrophils 3.5 1.6 - 8.3 10e3/uL    Absolute Lymphocytes 0.2 (L) 0.8 - 5.3 10e3/uL    Absolute Monocytes 0.5 0.0 - 1.3 10e3/uL    Absolute Eosinophils 0.0 0.0 - 0.7 10e3/uL    Absolute Basophils 0.1 0.0 - 0.2 10e3/uL    Absolute Immature Granulocytes 0.0 <=0.4 10e3/uL    Absolute NRBCs 0.0 10e3/uL   Magnesium     Status: Abnormal   Result Value Ref Range    Magnesium 1.2 (L) 1.7 - 2.3 mg/dL   [place] Draw     Status: None    Narrative    The following orders were created for panel order [place] Draw.  Procedure                               Abnormality         Status                     ---------                               -----------         ------                     Extra Urine Collection[[id number]]                           Final result                 Please view results for these tests on the individual orders.   Extra Urine Collection     Status: None   Result Value Ref Range    Hold Specimen [facility]    [place] Draw     Status: None    Narrative    The following orders were created for panel order [place] [facility].  Procedure                               Abnormality         Status                     ---------                               -----------         ------                     Extra Green Top (Lithium...[[id number]]                      Final result                 Please view results for these tests on the individual orders.   Extra Green Top (Lithium Heparin) Tube      Status: None   Result Value Ref Range    Hold Specimen [facility]    Comprehensive metabolic panel     Status: Abnormal   Result Value Ref Range    Sodium 135 (L) 136 - 145 mmol/L    Potassium 3.2 (L) 3.4 - 5.3 mmol/L    Chloride 94 (L) 98 - 107 mmol/L    Carbon Dioxide (CO2) 27 22 - 29 mmol/L    Anion Gap 14 7 - 15 mmol/L    Urea Nitrogen 6.8 6.0 - 20.0 mg/dL    Creatinine 0.62 (L) 0.67 - 1.17 mg/dL    Calcium 9.3 8.6 - 10.0 mg/dL    Glucose 186 (H) 70 - 99 mg/dL    Alkaline Phosphatase 63 40 - 129 U/L     (H) 10 - 50 U/L    ALT 75 (H) 10 - 50 U/L    Protein Total 6.8 6.4 - 8.3 g/dL    Albumin 4.2 3.5 - 5.2 g/dL    Bilirubin Total 1.4 (H) <=1.2 mg/dL    GFR Estimate >90 >60 mL/min/1.73m2   EKG 12 lead     Status: None (Preliminary result)   Result Value Ref Range    Systolic Blood Pressure  mmHg    Diastolic Blood Pressure  mmHg    Ventricular Rate 102 BPM    Atrial Rate 102 BPM    NH Interval 130 ms    QRS Duration 86 ms     ms    QTc 445 ms    P Axis 57 degrees    R AXIS 64 degrees    T Axis 33 degrees    Interpretation ECG Sinus tachycardia  Otherwise normal ECG      CBC with platelets differential     Status: Abnormal    Narrative    The following orders were created for panel order CBC with platelets differential.  Procedure                               Abnormality         Status                     ---------                               -----------         ------                     CBC with platelets and d...[[id number]]  Abnormal            Final result                 Please view results for these tests on the individual orders.     Medications   0.9% sodium chloride BOLUS (0 mLs Intravenous Stopped 3/12/23 1559)     Followed by   sodium chloride 0.9% infusion ( Intravenous Not Given 3/12/23 1559)   magnesium sulfate 2 g in 50 mL sterile water intermittent infusion (2 g Intravenous $New Bag 3/12/23 1558)   cloNIDine (CATAPRES) tablet 0.1 mg (has no administration in time range)   OLANZapine  zydis (zyPREXA) ODT tab 5-10 mg (has no administration in time range)     Or   haloperidol lactate (HALDOL) injection 2.5-5 mg (has no administration in time range)   flumazenil (ROMAZICON) injection 0.2 mg (has no administration in time range)   melatonin tablet 5 mg (has no administration in time range)   gabapentin (NEURONTIN) capsule 900 mg (has no administration in time range)   gabapentin (NEURONTIN) capsule 600 mg (has no administration in time range)   gabapentin (NEURONTIN) capsule 300 mg (has no administration in time range)   gabapentin (NEURONTIN) capsule 100 mg (has no administration in time range)   diazepam (VALIUM) tablet 10 mg (has no administration in time range)     Or   diazepam (VALIUM) injection 5-10 mg (has no administration in time range)   thiamine (B-1) tablet 100 mg (has no administration in time range)   folic acid (FOLVITE) tablet 1 mg (has no administration in time range)   multivitamin w/minerals (THERA-VIT-M) tablet 1 tablet (has no administration in time range)   LORazepam (ATIVAN) injection 2 mg (2 mg Intravenous $Given 3/12/23 1355)   LORazepam (ATIVAN) injection 1 mg (1 mg Intravenous $Given 3/12/23 1453)   potassium chloride (KLOR-CON) Packet 40 mEq (40 mEq Oral $Given 3/12/23 1536)   gabapentin (NEURONTIN) tablet 1,200 mg (1,200 mg Oral $Given 3/12/23 1536)   sodium chloride 0.9 % 1,000 mL with thiamine 100 mg, folic acid 1 mg infusion ( Intravenous $New Bag 3/12/23 1556)     Labs Ordered and Resulted from Time of ED Arrival to Time of ED Departure   CBC WITH PLATELETS AND DIFFERENTIAL - Abnormal       Result Value    WBC Count 4.3      RBC Count 4.42      Hemoglobin 13.9      Hematocrit 40.0      MCV 91      MCH 31.4      MCHC 34.8      RDW 12.3      Platelet Count 183      % Neutrophils 83      % Lymphocytes 4      % Monocytes 11      % Eosinophils 0      % Basophils 1      % Immature Granulocytes 1      NRBCs per 100 WBC 0      Absolute Neutrophils 3.5      Absolute  Lymphocytes 0.2 (*)     Absolute Monocytes 0.5      Absolute Eosinophils 0.0      Absolute Basophils 0.1      Absolute Immature Granulocytes 0.0      Absolute NRBCs 0.0     MAGNESIUM - Abnormal    Magnesium 1.2 (*)    COMPREHENSIVE METABOLIC PANEL - Abnormal    Sodium 135 (*)     Potassium 3.2 (*)     Chloride 94 (*)     Carbon Dioxide (CO2) 27      Anion Gap 14      Urea Nitrogen 6.8      Creatinine 0.62 (*)     Calcium 9.3      Glucose 186 (*)     Alkaline Phosphatase 63       (*)     ALT 75 (*)     Protein Total 6.8      Albumin 4.2      Bilirubin Total 1.4 (*)     GFR Estimate >90     PHOSPHORUS   INR     Cervical spine CT w/o contrast   Final Result   IMPRESSION:   HEAD CT:   1.  No acute intracranial process.      CERVICAL SPINE CT:   1.  No CT evidence for acute fracture or post traumatic subluxation.      Head CT w/o contrast   Final Result   IMPRESSION:   HEAD CT:   1.  No acute intracranial process.      CERVICAL SPINE CT:   1.  No CT evidence for acute fracture or post traumatic subluxation.             Critical care was not performed.     Medical Decision Making  The patient's presentation was of high complexity (an acute health issue posing potential threat to life or bodily function).    The patient's evaluation involved:  ordering and/or review of 3+ test(s) in this encounter (see separate area of note for details)    The patient's management necessitated high risk (a decision regarding hospitalization).      Assessment & Plan    This is a 32-year-old male with history of alcohol abuse and alcohol withdrawal seizures presenting with concerns for alcohol withdrawal and reported seizure activity at home.  On presentation patient is tachycardic heart rate 107, he is without hypoxia, blood pressure initially 136/88.  Physical exam shows patient appearing very anxious, with significant tremors present.  He is cooperative alert, although does appear anxious and uncomfortable.  He has findings  consistent with a superficial lip laceration, and has no focal neurologic deficits on his neuro testing.  It is unclear whether or not he has hit his head during his time alone at home and repeated seizures.  EKG was initially obtained and on my interpretation shows no arrhythmia or findings to suggest ischemia.  Neuroimaging was ordered as well as C-spine CT, and basic lab work.    CBC reviewed without leukocytosis or anemia.  Metabolic function shows hypokalemia and hypomagnesemia, these were repleted in the ED.  Patient also slightly hyponatremic at 135.  LFTs slightly elevated ALT 75 .  CT of the head negative for skull fracture, obvious head bleed.  CT C-spine negative for subluxation or fracture.  Patient was given 1 L normal saline, and several doses of Ativan which did help significantly with the tremors, patient tells me that he feels relaxed.  With patient's history of prior seizures and prior visit of seizures and inability protect his airway, feel that inpatient admission versus detox would be most beneficial.  Patient will be admitted to the medicine team.  Report given.    Patient seen and discussed with attending physician , who agrees with my plan of care.    I have reviewed the nursing notes. I have reviewed the findings, diagnosis, plan and need for follow up with the patient.    New Prescriptions    No medications on file       Final diagnoses:   Alcohol withdrawal syndrome with complication (H)   Seizures (H)       Kimberly Titi, PAC  [facility] EMERGENCY DEPARTMENT  3/12/2023

## 2023-03-12 NOTE — LETTER
Transition Communication Hand-off for Care Transitions to Next Level of Care Provider    Name: Vianey Whitaker  : 1990  MRN #: [id number]  Primary Care Provider: Edwige Coffey     Primary Clinic: [address] [address]     Reason for Hospitalization:  Seizures (H) [R56.9]  Alcohol withdrawal syndrome with complication (H) [F10.939]  Admit Date/Time: 3/12/2023  1:18 PM  Discharge Date: 3/16/2023  Payor Source: Payor: [employer] / Plan: [employer] PMAP AND [employer] / Product Type: Indemnity /            Reason for Communication Hand-off Referral: Other Continuity of Care    Discharge Plan: Patient will be discharging home. He plans to go to  [facility]       Concern for non-adherence with plan of care:   Y/N No  Discharge Needs Assessment:  Needs    Flowsheet Row Most Recent Value   Equipment Currently Used at Home walker, standard            Follow-up plan:    Future Appointments   Date Time Provider Department Center   2023  2:15 PM Serjio Edwards MD [facility]         [facility]    Please see attached Physician Orders.

## 2023-03-12 NOTE — H&P
[facility]    History and Physical - Hospitalist Service, GOLD TEAM        Date of Admission:  3/12/2023    Assessment & Plan      Vianey Whitaker is a 32 year old male admitted on 3/12/2023. He has a known h/o depression and anxiety, alcohol dependency, previous history of severe withdrawals, mor recently requiring intubation, who presents to the hospital after voluntarily trying to quit a;lcohol at home, and having recurrent seizures, generally feeling unwell.  Individual problems and their management are outlined below    Alcohol dependency  Severe alcohol withdrawal  Alcohol withdrawal seizure   S/P IV Lorazepam X 2 already in the ER. Will likely require significant doses of benzodiazepines, given his known severe withdrawal history  CIWA protocol with Valium  High dose gabapentin taper and scheduled clonidine   S/P Multivitamin transfusion followed by oral vitamins and folic acid   Seizure precautions  CD consult ordered      Depression  Resume Fluoxetine 40 mg  Psychiatry inpatient consultation to evaluate severe depression and triggers for continued alcohol dependency     Hyponatremia  Hypokalemia  Hypomagnesemia  Hypophosphatemia   This is likely related to poor oral intake and diarrhea related to withdrawal   Replace with IV fluids and electrolyte replacement protocol ( for magnesium only)  Scheduled neutra phos TID, Magnesium Oxide 400 mg daily & KDur ( 3 doses) for now   PRN imodium for persistent diarrhea   Recheck labs am  RD evaluation to screen for malnutrition   QTC at 445      Alcohol related transaminitis  Alcohol related Hyperbilirubinemia  AST at 127, ALT at 127 and T bili at 186  Maddrey's discriminant score at   Does not meet criteria for steroids  Continue to monitor            Diet: Snacks/Supplements Adult: Ensure Clear; Between MealsRegular adult   DVT Prophylaxis: Pneumatic Compression Devices  Lara Catheter: Not present  Lines: None     Cardiac  Monitoring: None  Code Status:   Full     Clinically Significant Risk Factors Present on Admission        # Hypokalemia: Lowest K = 3.2 mmol/L in last 2 days, will replace as needed     # Hypomagnesemia: Lowest Mg = 1.2 mg/dL in last 2 days, will replace as needed                    Disposition Plan      Expected Discharge Date: 03/14/2023                  Brian Fragoso MD  Hospitalist Service, [facility]  Securely message with [email or web address] (more info)  Text page via [facility] Paging/Directory   See signed in provider for up to date coverage information    ______________________________________________________________________    Chief Complaint   Alcohol withdrawal seizures     History is obtained from the patient, patient's sister and chart review     History of Present Illness   Vianey Whitaker is a 32 year old male past medical history significant for alcohol use with history of withdrawal, DTs and withdrawal seizure who presents to the emergency department with his sister with concerns for alcohol withdrawal and seizures.     Patient presents via EMS, where he was given 2 mg Versed in route.  Patient typically drinks 2 full glasses of scotch a day, his last drink was either Thursday night or Friday morning, he does not recall.  He states that since, he has been feeling ill with associated vomiting, and reported seizures over the weekend.  Patient has been staying at his dad's place, alone, states he does not recall falling although is unsure as he states he thinks he has had multiple seizures.      Patient's father also has alcohol issues and has been admitted to another hospital related to alcoholic liver disease and ascites    He was seen by his sister today who noted that patient had a witnessed seizure, lasting about 2 to 3 minutes before resolving.  Patient did bite his tongue during the seizure.  Patient has complicated history of alcohol  withdrawal seizures in the past, and has been admitted in in the past intubated due to him having difficulty protecting his airway. This was at [facility] in Dec 2022. His hospital course was significant for at;least 5-6 days of intubation, and after transfer to a regular floor, patient had pneumonia. Per sister, much of the treatment was aimed at treating the pneumonia and strengthening. There was not much consultation or resources provided to patient from an alcohol addiction standpoint       Patient states that he is agreeable to admission.  He states he does not use any other drugs.  He reports passive suicidal thoughts in the past without any recent thoughts of harming himself or others, he has no plan and has not attempted suicide in the past.  He denies any visual or auditory hallucinations at this time.  Patient states he feels warm though has no measured fevers at home.  He is having a little sore throat and cough today, without other symptoms.  He is having some generalized abdominal discomfort with his symptoms here as well.  He did have some diarrhea in the last 24 hours without any bloody or black stools, hematemesis, or hemoptysis.  He does report a headache with his symptoms currently.  No significant neck pain.   He also notes that he has a lesion on th left side of his tongue, which grew at the site where he last bit his tongue during his seizure  In dec 2022.     Patient says that he has not been in any formal alcohol treatment program, and wishes to pursue this time      Past Medical History    Past Medical History:   Diagnosis Date     Alcohol dependence (H)      Alcohol withdrawal seizure (H)      Anxiety      Major depression        Past Surgical History   No past surgical history on file.    Prior to Admission Medications   Prior to Admission Medications   Prescriptions Last Dose Informant Patient Reported? Taking?   FLUoxetine (PROZAC) 40 MG capsule Past Week  Yes Yes   Sig: Take 40 mg by mouth  daily   LORazepam (ATIVAN) 1 MG tablet Past Week  Yes Yes   Sig: Take 1 tablet by mouth daily as needed   famotidine (PEPCID) 20 MG tablet 3/11/2023  Yes Yes   Sig: Take 20 mg by mouth daily   folic acid (FOLVITE) 1 MG tablet Past Week  No Yes   Sig: Take 1 tablet (1 mg) by mouth daily   naltrexone (DEPADE/REVIA) 50 MG tablet Past Week  Yes Yes   Sig: Take 50 mg by mouth daily   thiamine (B-1) 100 MG tablet Past Week  Yes Yes   Sig: Take 100 mg by mouth daily      Facility-Administered Medications: None        Review of Systems    The 10 point Review of Systems is negative other than noted in the HPI or here.     Social History   I have reviewed this patient's social history and updated it with pertinent information if needed.  Social History     Tobacco Use     Smoking status: Never     Smokeless tobacco: Never   Substance Use Topics     Alcohol use: Yes     Alcohol/week: 7.0 - 8.0 standard drinks     Types: 7 - 8 Shots of liquor per week     Comment: 2 full glasses of whiseky every day     Drug use: Never       Family History     Family history reviewed. Alcoholism in father     Allergies   Allergies   Allergen Reactions     Ceftriaxone Shortness Of Breath and Rash     Received first dose of antibiotic on 12/2 and immediately after pt found to be red all over and had spots of macularpapular rash        Physical Exam   Vital Signs: Temp: 98.1  F (36.7  C) Temp src: Oral BP: 128/89 Pulse: 99   Resp: 15 SpO2: 98 %      Weight: 0 lbs 0 oz  General Appearance: Awake, alert and not in distress  Eyes: Icteric sclera   Oral cavity: 1 cms raised white lesion in the left lateral tongue, growing from a site of  previous tounge bite when he had a seizure in December 2022.  Respiratory: Clear breath sounds bilaterally   Cardiovascular: Normal heart sounds. No murmurs   GI: Soft, non tender. Normal bowel sounds   Skin: No bruising or bleeding   MSK: No bruising or bleeding. No skull or neck tenderness noted to palpation    Other:Awake, alert and orientated X 3       Medical Decision Making       75 MINUTES SPENT BY ME on the date of service doing chart review, history, exam, documentation & further activities per the note.  MANAGEMENT DISCUSSED with the following over the past 24 hours: magdy, patient's family and bedside RN       Data     I have personally reviewed the following data over the past 24 hrs:    4.3  \   13.9   / 183     135 (L) 94 (L) 6.8 /  186 (H)   3.2 (L) 27 0.62 (L) \       ALT: 75 (H) AST: 127 (H) AP: 63 TBILI: 1.4 (H)   ALB: 4.2 TOT PROTEIN: 6.8 LIPASE: N/A          EKG with QTC at 445. Sinus tachycardia   CT head and neck with no acute abnormality

## 2023-03-12 NOTE — ED NOTES
[facility] left via stretcher, escorted by transport staff. He appeared in stable condition upon transfer. Report called to unit.

## 2023-03-13 LAB
ATRIAL RATE - MUSE: 102 BPM
DIASTOLIC BLOOD PRESSURE - MUSE: NORMAL MMHG
INTERPRETATION ECG - MUSE: NORMAL
MAGNESIUM SERPL-MCNC: 3.3 MG/DL (ref 1.7–2.3)
P AXIS - MUSE: 57 DEGREES
PR INTERVAL - MUSE: 130 MS
QRS DURATION - MUSE: 86 MS
QT - MUSE: 342 MS
QTC - MUSE: 445 MS
R AXIS - MUSE: 64 DEGREES
SYSTOLIC BLOOD PRESSURE - MUSE: NORMAL MMHG
T AXIS - MUSE: 33 DEGREES
VENTRICULAR RATE- MUSE: 102 BPM

## 2023-03-13 PROCEDURE — 99223 1ST HOSP IP/OBS HIGH 75: CPT | Performed by: PSYCHIATRY & NEUROLOGY

## 2023-03-13 PROCEDURE — 99232 SBSQ HOSP IP/OBS MODERATE 35: CPT | Performed by: INTERNAL MEDICINE

## 2023-03-13 PROCEDURE — 250N000013 HC RX MED GY IP 250 OP 250 PS 637: Performed by: PSYCHIATRY & NEUROLOGY

## 2023-03-13 PROCEDURE — 36415 COLL VENOUS BLD VENIPUNCTURE: CPT

## 2023-03-13 PROCEDURE — 83735 ASSAY OF MAGNESIUM: CPT

## 2023-03-13 PROCEDURE — 120N000002 HC R&B MED SURG/OB UMMC

## 2023-03-13 PROCEDURE — 258N000003 HC RX IP 258 OP 636: Performed by: EMERGENCY MEDICINE

## 2023-03-13 PROCEDURE — 250N000013 HC RX MED GY IP 250 OP 250 PS 637: Performed by: INTERNAL MEDICINE

## 2023-03-13 RX ORDER — DIPHENHYDRAMINE HYDROCHLORIDE AND LIDOCAINE HYDROCHLORIDE AND ALUMINUM HYDROXIDE AND MAGNESIUM HYDRO
10 KIT EVERY 6 HOURS PRN
Status: DISCONTINUED | OUTPATIENT
Start: 2023-03-13 | End: 2023-03-16 | Stop reason: HOSPADM

## 2023-03-13 RX ORDER — FAMOTIDINE 20 MG/1
20 TABLET, FILM COATED ORAL 2 TIMES DAILY
Status: DISCONTINUED | OUTPATIENT
Start: 2023-03-13 | End: 2023-03-16 | Stop reason: HOSPADM

## 2023-03-13 RX ORDER — LIDOCAINE 40 MG/G
CREAM TOPICAL
Status: DISCONTINUED | OUTPATIENT
Start: 2023-03-13 | End: 2023-03-16 | Stop reason: HOSPADM

## 2023-03-13 RX ORDER — ONDANSETRON 2 MG/ML
4 INJECTION INTRAMUSCULAR; INTRAVENOUS EVERY 6 HOURS PRN
Status: DISCONTINUED | OUTPATIENT
Start: 2023-03-13 | End: 2023-03-16 | Stop reason: HOSPADM

## 2023-03-13 RX ORDER — MIRTAZAPINE 15 MG/1
15 TABLET, FILM COATED ORAL AT BEDTIME
Status: DISCONTINUED | OUTPATIENT
Start: 2023-03-13 | End: 2023-03-16 | Stop reason: HOSPADM

## 2023-03-13 RX ORDER — ONDANSETRON 4 MG/1
4 TABLET, ORALLY DISINTEGRATING ORAL EVERY 6 HOURS PRN
Status: DISCONTINUED | OUTPATIENT
Start: 2023-03-13 | End: 2023-03-16 | Stop reason: HOSPADM

## 2023-03-13 RX ADMIN — POTASSIUM CHLORIDE 40 MEQ: 750 TABLET, EXTENDED RELEASE ORAL at 08:30

## 2023-03-13 RX ADMIN — GABAPENTIN 900 MG: 300 CAPSULE ORAL at 08:29

## 2023-03-13 RX ADMIN — FAMOTIDINE 20 MG: 20 TABLET ORAL at 08:30

## 2023-03-13 RX ADMIN — FOLIC ACID 1 MG: 1 TABLET ORAL at 08:30

## 2023-03-13 RX ADMIN — MIRTAZAPINE 15 MG: 15 TABLET, FILM COATED ORAL at 23:11

## 2023-03-13 RX ADMIN — SODIUM CHLORIDE, PRESERVATIVE FREE: 5 INJECTION INTRAVENOUS at 05:09

## 2023-03-13 RX ADMIN — FLUOXETINE 40 MG: 20 CAPSULE ORAL at 08:30

## 2023-03-13 RX ADMIN — FAMOTIDINE 20 MG: 20 TABLET ORAL at 20:54

## 2023-03-13 RX ADMIN — GABAPENTIN 900 MG: 300 CAPSULE ORAL at 23:10

## 2023-03-13 RX ADMIN — DIBASIC SODIUM PHOSPHATE, MONOBASIC POTASSIUM PHOSPHATE AND MONOBASIC SODIUM PHOSPHATE 250 MG: 852; 155; 130 TABLET ORAL at 13:15

## 2023-03-13 RX ADMIN — THIAMINE HCL TAB 100 MG 100 MG: 100 TAB at 08:30

## 2023-03-13 RX ADMIN — MULTIPLE VITAMINS W/ MINERALS TAB 1 TABLET: TAB at 08:30

## 2023-03-13 RX ADMIN — GABAPENTIN 900 MG: 300 CAPSULE ORAL at 16:24

## 2023-03-13 RX ADMIN — MAGNESIUM OXIDE TAB 400 MG (241.3 MG ELEMENTAL MG) 400 MG: 400 (241.3 MG) TAB at 08:36

## 2023-03-13 RX ADMIN — DIBASIC SODIUM PHOSPHATE, MONOBASIC POTASSIUM PHOSPHATE AND MONOBASIC SODIUM PHOSPHATE 250 MG: 852; 155; 130 TABLET ORAL at 20:54

## 2023-03-13 RX ADMIN — POTASSIUM CHLORIDE 40 MEQ: 750 TABLET, EXTENDED RELEASE ORAL at 13:15

## 2023-03-13 RX ADMIN — CLONIDINE HYDROCHLORIDE 0.1 MG: 0.1 TABLET ORAL at 08:29

## 2023-03-13 RX ADMIN — DIBASIC SODIUM PHOSPHATE, MONOBASIC POTASSIUM PHOSPHATE AND MONOBASIC SODIUM PHOSPHATE 250 MG: 852; 155; 130 TABLET ORAL at 08:31

## 2023-03-13 RX ADMIN — CLONIDINE HYDROCHLORIDE 0.1 MG: 0.1 TABLET ORAL at 23:11

## 2023-03-13 RX ADMIN — MAGNESIUM OXIDE TAB 400 MG (241.3 MG ELEMENTAL MG) 400 MG: 400 (241.3 MG) TAB at 20:54

## 2023-03-13 RX ADMIN — CLONIDINE HYDROCHLORIDE 0.1 MG: 0.1 TABLET ORAL at 16:24

## 2023-03-13 RX ADMIN — SODIUM CHLORIDE, PRESERVATIVE FREE: 5 INJECTION INTRAVENOUS at 19:13

## 2023-03-13 ASSESSMENT — ACTIVITIES OF DAILY LIVING (ADL)
ADLS_ACUITY_SCORE: 38

## 2023-03-13 NOTE — PLAN OF CARE
Goal Outcome Evaluation:      Plan of Care Reviewed With: patient    Overall Patient Progress: improvingOverall Patient Progress: improving         [id number]    Pt alert and oriented x4, denied pain, denied ETOH withdrawal symptoms, on seizure precuations, CIWA score 3, mild anxiety and tremor. Bed alarm on for safety. Pt reports good appetite no nausea or emesis. New PIV needed for continuous fluids.Pt calls appropriately. Continue POC

## 2023-03-13 NOTE — PROGRESS NOTES
6MS ADMISSION    D: Patient transferred from ED via stretcher for alcohol withdrawal syndrome complicated with seizures.     I: Upon arrival to the unit patient was oriented to room, unit, and call light. Patient s weight, and vital signs were obtained. Allergies reviewed and allergy band applied. Provider notified of patient s arrival on the unit. Adult AVS completed. Head to toe assessment completed. Education assessment completed. Care plan initiated.    A: Vital signs stable upon admission. Patient rates pain at 2/10 at back of neck, mild headache and sore bottom lips. Two RN skin assessment completed with second RN Tyrone Castro. Significant Skin Findings include swollen lower lips, dry tongue and an old healed bruise at left upper arm. PIV present at left anterior forearm. Normal saline infusing at 125 ml/hr as per order. VSS and WNL. Breathing normal and unlabored. Patient complains of mild headache, otherwise denies chest pain, nausea vomiting, abdominal discomfort, numbness tingling or hallucinations. Mild tremors present. Seizure precautions maintained, Bed alarm on. Continent of bowel and bladder, denies any concerns. Reports last BM yesterday, loose.     P: Continue to monitor patient s CIWA score and intervene as needed. CIWA score 6 upon admission. Continue with plan of care. Notify provider with any concerns or changes in patient status.

## 2023-03-13 NOTE — PLAN OF CARE
A/Ox 4. Able to make needs known. Denies SOB, N/V, and CP. Continent of B/B. LBM 3/11/2023 per pt report. Ax1 with walker and GB d/t unsteady gait and mild tremors. L PIV patent and infusing with NS running at 125 mL/hr. On seizure precautions. Bed alarm maintained. No report of pain. Appears to be sleeping during rounds. Call light within reach. Continue with POC.    Most recent vitals:  Temp: 98.9  F (37.2  C) Temp src: Oral BP: 107/70 Pulse: 98   Resp: 14 SpO2: 98 % O2 Device: None (Room air)

## 2023-03-13 NOTE — CONSULTS
PSYCHIATRIC CONSULTATION    Requesting Physician: Perico Torres MD    Admission Date: 03/12/2023  Date of Service: 03/13/2023    The patient was seen, his chart reviewed, report to follow.    Dx: Alcohol Use Disorder        Alcohol Withdrawal Syndrome        Alcohol Withdrawal Seizures        Major Depression, recurrent    Plan: Continue CIWA protocol with Valium and Gabapentin. Order CD consult with appropriate treatment referral. Continue Prozac in the same dose and add Remeron 15 mg at bedtime. The patient may be considered for transfer to the psychiatric unit when through detox.    Thanks,    Alfonso Lira MD

## 2023-03-13 NOTE — PLAN OF CARE
Goal Outcome Evaluation:      VS: /74 (BP Location: Right arm)   Pulse 78   Temp 98  F (36.7  C) (Oral)   Resp 16   Wt 73.7 kg (162 lb 7.7 oz)   SpO2 98%      O2: Stable on room air >90%   Output: Voids without difficulty, bedside urinal   Last BM: 3/11/23   Activity: Assist x1   Up for meals? Yes   Skin: Lt upper arm healed bruise   Pain: Denies pain   CMS: AO x4, denies SOB, chest pain, numbness and tingling   Dressing: N/A   Diet: Regular diet   LDA: Lt arm PIV infusing NS at 125 mL/hr   Equipment: IV pole, call light, walker, gait belt, personal belongings   Plan: Continue with POC   Additional Info: Seizure precaution and bed alarm in place. CIWA-Ar <4
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
## 2023-03-13 NOTE — PROGRESS NOTES
[facility]    Medicine Progress Note - Hospitalist Service, GOLD TEAM 19    Date of Admission:  3/12/2023    Assessment & Plan   Vianey Whitaker is a 32 year old male admitted on 3/12/2023. He has a known h/o depression and anxiety, alcohol dependency, previous history of severe withdrawals, more recently requiring intubation, who presents to the hospital after voluntarily trying to quit alcohol at home, and having recurrent seizures, generally feeling unwell.    Individual problems and their management are outlined below:     #Alcohol dependency  #Severe alcohol withdrawal  #Alcohol withdrawal seizure   - S/P IV Lorazepam X 2 already in the ER. Will likely require significant doses of benzodiazepines, given his known severe withdrawal history  - CIWA protocol with Valium  - High dose gabapentin taper and scheduled clonidine   - S/P Multivitamin transfusion followed by oral vitamins and folic acid   - Seizure precautions  - CD consult ordered     #Depression  - Resume Fluoxetine 40 mg  - Psychiatry inpatient consultation to evaluate severe depression and triggers for continued alcohol dependency      #Hyponatremia  #Hypokalemia  #Hypomagnesemia  #Hypophosphatemia   - This is likely related to poor oral intake and diarrhea related to withdrawal   - Replace with IV fluids and electrolyte replacement protocol ( for magnesium only)  - Scheduled neutra phos TID, Magnesium Oxide 400 mg daily & KDur ( 3 doses) for now   - PRN imodium for persistent diarrhea   - Recheck labs am  - RD evaluation to screen for malnutrition   - QTC at 445      #Alcohol related transaminitis  #Alcohol related Hyperbilirubinemia  - AST at 127, ALT at 127 and T bili at 186  - Does not meet criteria for steroids  - Continue to monitor        Diet: Combination Diet Regular Diet Adult  Snacks/Supplements Adult: Ensure Clear; Between Meals    DVT Prophylaxis: Pneumatic Compression Devices  Lara Catheter:  Not present  Lines: None     Cardiac Monitoring: None  Code Status: Full Code      Clinically Significant Risk Factors        # Hypokalemia: Lowest K = 3.2 mmol/L in last 2 days, will replace as needed     # Hypomagnesemia: Lowest Mg = 1.2 mg/dL in last 2 days, will replace as needed                     Disposition Plan      Expected Discharge Date: 03/16/2023                  Colby Yun DO, CASSANDRA  Hospitalist Service, GOLD TEAM 19  [facility]  Securely message with [employer] (more info)  Text page via [facility] Paging/Directory   See signed in provider for up to date coverage information  ______________________________________________________________________    Interval History   Patient sleeping comfortably upon entering room.  Per nursing staff, no acute events overnight; seizure activity.  Still pending chemical dependency patient.    Physical Exam   Vital Signs: Temp: 99.3  F (37.4  C) Temp src: Oral BP: 106/71 Pulse: 82   Resp: 18 SpO2: 99 % O2 Device: None (Room air)    Weight: 162 lbs 7.66 oz    GENERAL: Alert and oriented x 3; no acute distress; well-nourished.  HEENT: Normocephalic; atraumatic; PERRLA; MMM.  CV: RRR; normal S1, S2; no rubs, murmurs, or gallops.  RESP: Lung fields clear to aucultation B/L; no wheezing or crepitations.  GI: Abdomen is soft, nontender, nondistended; no organomegaly; normal bowel sounds.  : Deferred genital examination.   MSK: No clubbing, cyanosis, or edema.  DERM: Skin is intact; no rash, lesions, or skin breakdown.  NEURO: No focal deficits appreciated; strength & sensorium are grossly intact.  PSYCH: Unable to fully evaluate psychiatric status at present.    Medical Decision Making       45 MINUTES SPENT BY ME on the date of service doing chart review, history, exam, documentation & further activities per the note.      Data     I have personally reviewed the following data over the past 24 hrs:    INR:  1.09 PTT:  N/A   D-dimer:   N/A Fibrinogen:  N/A       Imaging results reviewed over the past 24 hrs:   No results found for this or any previous visit (from the past 24 hour(s)).

## 2023-03-14 LAB
ALBUMIN SERPL BCG-MCNC: 3.9 G/DL (ref 3.5–5.2)
ALP SERPL-CCNC: 75 U/L (ref 40–129)
ALT SERPL W P-5'-P-CCNC: 171 U/L (ref 10–50)
ANION GAP SERPL CALCULATED.3IONS-SCNC: 9 MMOL/L (ref 7–15)
AST SERPL W P-5'-P-CCNC: 349 U/L (ref 10–50)
BILIRUB SERPL-MCNC: 1.4 MG/DL
BUN SERPL-MCNC: 3.5 MG/DL (ref 6–20)
CALCIUM SERPL-MCNC: 9.1 MG/DL (ref 8.6–10)
CHLORIDE SERPL-SCNC: 105 MMOL/L (ref 98–107)
CREAT SERPL-MCNC: 0.66 MG/DL (ref 0.67–1.17)
DEPRECATED HCO3 PLAS-SCNC: 26 MMOL/L (ref 22–29)
ERYTHROCYTE [DISTWIDTH] IN BLOOD BY AUTOMATED COUNT: 12.4 % (ref 10–15)
GFR SERPL CREATININE-BSD FRML MDRD: >90 ML/MIN/1.73M2
GLUCOSE SERPL-MCNC: 103 MG/DL (ref 70–99)
HCT VFR BLD AUTO: 41.8 % (ref 40–53)
HGB BLD-MCNC: 13.6 G/DL (ref 13.3–17.7)
MAGNESIUM SERPL-MCNC: 2.1 MG/DL (ref 1.7–2.3)
MCH RBC QN AUTO: 31.1 PG (ref 26.5–33)
MCHC RBC AUTO-ENTMCNC: 32.5 G/DL (ref 31.5–36.5)
MCV RBC AUTO: 96 FL (ref 78–100)
PLATELET # BLD AUTO: 143 10E3/UL (ref 150–450)
POTASSIUM SERPL-SCNC: 4 MMOL/L (ref 3.4–5.3)
PROT SERPL-MCNC: 6.6 G/DL (ref 6.4–8.3)
RBC # BLD AUTO: 4.37 10E6/UL (ref 4.4–5.9)
SODIUM SERPL-SCNC: 140 MMOL/L (ref 136–145)
WBC # BLD AUTO: 4.4 10E3/UL (ref 4–11)

## 2023-03-14 PROCEDURE — 120N000002 HC R&B MED SURG/OB UMMC

## 2023-03-14 PROCEDURE — 250N000013 HC RX MED GY IP 250 OP 250 PS 637: Performed by: INTERNAL MEDICINE

## 2023-03-14 PROCEDURE — 85027 COMPLETE CBC AUTOMATED: CPT | Performed by: INTERNAL MEDICINE

## 2023-03-14 PROCEDURE — 36415 COLL VENOUS BLD VENIPUNCTURE: CPT | Performed by: INTERNAL MEDICINE

## 2023-03-14 PROCEDURE — 99232 SBSQ HOSP IP/OBS MODERATE 35: CPT | Performed by: INTERNAL MEDICINE

## 2023-03-14 PROCEDURE — 80053 COMPREHEN METABOLIC PANEL: CPT | Performed by: INTERNAL MEDICINE

## 2023-03-14 PROCEDURE — 258N000003 HC RX IP 258 OP 636: Performed by: EMERGENCY MEDICINE

## 2023-03-14 PROCEDURE — 999N000216 HC STATISTIC ADULT CD FACE TO FACE-NO CHRG

## 2023-03-14 PROCEDURE — 250N000013 HC RX MED GY IP 250 OP 250 PS 637: Performed by: PSYCHIATRY & NEUROLOGY

## 2023-03-14 PROCEDURE — 99232 SBSQ HOSP IP/OBS MODERATE 35: CPT

## 2023-03-14 PROCEDURE — 83735 ASSAY OF MAGNESIUM: CPT

## 2023-03-14 RX ADMIN — MULTIPLE VITAMINS W/ MINERALS TAB 1 TABLET: TAB at 07:47

## 2023-03-14 RX ADMIN — MIRTAZAPINE 15 MG: 15 TABLET, FILM COATED ORAL at 22:29

## 2023-03-14 RX ADMIN — DIBASIC SODIUM PHOSPHATE, MONOBASIC POTASSIUM PHOSPHATE AND MONOBASIC SODIUM PHOSPHATE 250 MG: 852; 155; 130 TABLET ORAL at 07:48

## 2023-03-14 RX ADMIN — CLONIDINE HYDROCHLORIDE 0.1 MG: 0.1 TABLET ORAL at 14:30

## 2023-03-14 RX ADMIN — GABAPENTIN 900 MG: 300 CAPSULE ORAL at 22:29

## 2023-03-14 RX ADMIN — DIBASIC SODIUM PHOSPHATE, MONOBASIC POTASSIUM PHOSPHATE AND MONOBASIC SODIUM PHOSPHATE 250 MG: 852; 155; 130 TABLET ORAL at 20:24

## 2023-03-14 RX ADMIN — SODIUM CHLORIDE, PRESERVATIVE FREE: 5 INJECTION INTRAVENOUS at 10:06

## 2023-03-14 RX ADMIN — FOLIC ACID 1 MG: 1 TABLET ORAL at 07:47

## 2023-03-14 RX ADMIN — FLUOXETINE 40 MG: 20 CAPSULE ORAL at 07:47

## 2023-03-14 RX ADMIN — MAGNESIUM OXIDE TAB 400 MG (241.3 MG ELEMENTAL MG) 400 MG: 400 (241.3 MG) TAB at 20:24

## 2023-03-14 RX ADMIN — FAMOTIDINE 20 MG: 20 TABLET ORAL at 07:47

## 2023-03-14 RX ADMIN — MAGNESIUM OXIDE TAB 400 MG (241.3 MG ELEMENTAL MG) 400 MG: 400 (241.3 MG) TAB at 07:47

## 2023-03-14 RX ADMIN — GABAPENTIN 900 MG: 300 CAPSULE ORAL at 07:00

## 2023-03-14 RX ADMIN — FAMOTIDINE 20 MG: 20 TABLET ORAL at 20:24

## 2023-03-14 RX ADMIN — GABAPENTIN 900 MG: 300 CAPSULE ORAL at 14:29

## 2023-03-14 RX ADMIN — SODIUM CHLORIDE, PRESERVATIVE FREE: 5 INJECTION INTRAVENOUS at 01:56

## 2023-03-14 RX ADMIN — DIBASIC SODIUM PHOSPHATE, MONOBASIC POTASSIUM PHOSPHATE AND MONOBASIC SODIUM PHOSPHATE 250 MG: 852; 155; 130 TABLET ORAL at 14:30

## 2023-03-14 RX ADMIN — CLONIDINE HYDROCHLORIDE 0.1 MG: 0.1 TABLET ORAL at 07:00

## 2023-03-14 RX ADMIN — CLONIDINE HYDROCHLORIDE 0.1 MG: 0.1 TABLET ORAL at 22:29

## 2023-03-14 RX ADMIN — THIAMINE HCL TAB 100 MG 100 MG: 100 TAB at 07:47

## 2023-03-14 ASSESSMENT — ACTIVITIES OF DAILY LIVING (ADL)
ADLS_ACUITY_SCORE: 38

## 2023-03-14 NOTE — PROGRESS NOTES
SPIRITUAL HEALTH SERVICES  SPIRITUAL ASSESSMENT Progress Note  [facility] ([facility]) 6B Med Surge R 606 03/14/23  ON-CALL VISIT    REFERRAL SOURCE: Admission Request     Met with Pt to address spiritual needs. Pt wanted to find his direction with God. He was raised in [employer] and  his family on father's side were  all pastors and ministers. Pt knows he should be working in the [employer] support humanity. Prayed with Pt's to receive Bruce and be lead by his directions.    PLAN: Unit  can follow up with Pt.    Edison Valles MA, MPA  Associate    Pager: [phone]

## 2023-03-14 NOTE — CONSULTS
3/14/2023    CD consult completed.   Spoke with pt via bedside phone. Pt seemed guarded. We discussed different treatment options, reports he has never been to treatment. Pt asked for resources to be sent to him to his email (confirmed email with pt). Pt reports if he has further questions he will reach out. Email sent to pt with resources for residential and outpatient treatment.     Daisy Villanueva Carilion [email or web address]  Direct phone: [phone]

## 2023-03-14 NOTE — CONSULTS
Psychiatry Consultation; Follow up              Reason for Consult, requesting source:    Depression, disposition   Requesting source: Vlad    Labs and imaging reviewed, seen by CAT Winters CNP. Discussed with Dr. Chu     Total time spent in chart review, patient interview and coordination of care; 40 minutes                 Interim history:     Mr. Whitaker is a 32-year-old single white male with a long history of mood disorder and alcohol use disorder, history of alcohol withdrawal seizures, who was brought to our Emergency Department by his sister out of concerns of alcohol withdrawal and seizures.  He did have a witnessed seizure lasting about 3 minutes just prior to admission and had some seizures over the weekend.  He was placed on CIWA protocol with Valium and gabapentin and transferred to the Medical floor for further evaluation.  Psychiatric consultation was ordered to assess his depression and anxiety and advise on further management and he was seen by psychiatry 3/13 who continued prozac and started remeron at bedtime. Today, the patient states he slept very well. He is still guarded over his symptoms and drinking and I see he declined referrals or full assessment. I provided education that he should have a full assessment, which he says he has never had. He denies suicidal ideation, states he has not been suicidal for over 5 years.         Current Medications:       cloNIDine  0.1 mg Oral Q8H     famotidine  20 mg Oral BID     FLUoxetine  40 mg Oral Daily     folic acid  1 mg Oral Daily     [START ON 3/19/2023] gabapentin  100 mg Oral Q8H     [START ON 3/17/2023] gabapentin  300 mg Oral Q8H     [START ON 3/15/2023] gabapentin  600 mg Oral Q8H     gabapentin  900 mg Oral Q8H     magnesium oxide  400 mg Oral BID     mirtazapine  15 mg Oral At Bedtime     multivitamin w/minerals  1 tablet Oral Daily     phosphorus tablet 250 mg  250 mg Oral TID     sodium chloride (PF)  3 mL Intracatheter Q8H  "    thiamine  100 mg Oral Daily              Family and Social History:   Recently lost his job as a  at a [employer] shop           MSE:   Appearance: awake, alert, adequately groomed and dressed in hospital scrubs  Attitude:  guarded  Eye Contact:  fair  Mood:  depressed and \"fair\"  Affect:  : slightly restricted  Speech:  clear, coherent  Psychomotor Behavior:  no evidence of tardive dyskinesia, dystonia, or tics, mild tremor noted   Muscle strength and tone: baseline   Thought Process:  logical, linear and goal oriented  Associations:  no loose associations  Thought Content:  no evidence of suicidal ideation or homicidal ideation and no evidence of psychotic thought  Insight:  fair  Judgement:  fair  Oriented to:  time, person, and place  Attention Span and Concentration:  intact  Recent and Remote Memory:  intact    Vital signs:  Temp: 98.4  F (36.9  C) Temp src: Oral BP: 107/61 Pulse: 89   Resp: 16 SpO2: 96 % O2 Device: None (Room air)   Height: 177.8 cm (5' 10\") Weight: 76.1 kg (167 lb 12.3 oz)  Estimated body mass index is 24.07 kg/m  as calculated from the following:    Height as of this encounter: 1.778 m (5' 10\").    Weight as of this encounter: 76.1 kg (167 lb 12.3 oz).           DSM-5 Diagnosis:   1.  Alcohol use disorder.  2.  Alcohol withdrawal syndrome.  3.  Alcohol withdrawal seizures.  4.  Major depression, recurrent.          Assessment:   Vianey is a 32 year old male with a history of the above diagnoses. He is not suicidal, manic, psychotic and does not meet criteria for acute stabilization in inpatient psychiatry. Discussed that it is my recommendation he have a full chemical dependency evaluation, rule 25, provided education and answered questions and patient agreed. I discussed my strong recommendation for residential dual diagnosis programming due to severe alcohol use that led to seizure and depression related to alcohol use.           Summary of Recommendations:     --Ordered repeat " "chemical dependency for full assessment. I recommend MICD treatment     --Continue medications as ordered      Lisa Salinas, [facility] Center[facility]  Consult/Liaison Psychiatry   [facility]       \"Much or all of the text in this note was generated through the use of Dragon Dictate voice to text software. Errors in spelling or words which appear to be out of contact are unintentional, may be present due having escaped editing\"           "

## 2023-03-14 NOTE — PLAN OF CARE
Temp: 97.9  F (36.6  C) Temp src: Oral BP: 108/69 Pulse: 82   Resp: 16 SpO2: 96 % O2 Device: None (Room air)      [id number]   No acute events overnight  AOx4. Denies CP, SOB, N/T  Voiding without difficulty. LBM 3/12/23  Pt denies pain this shift  CIWA- 4, 5, 2. Tremulous and anxious. No valium given  L PIV infusing NS  Call light within reach at all times. Pt able to make needs known. Continue with POC.

## 2023-03-14 NOTE — PROGRESS NOTES
[facility]    Medicine Progress Note - Hospitalist Service, GOLD TEAM 16    Date of Admission:  3/12/2023    Assessment & Plan     A: Patient is a 33 y/o man who has depression, anxiety and alcohol dependence with a history of severe withdrawal, more recently requiring intubation. Patient presented to the hospital after voluntarily trying to quit alcohol at home and having recurrent seizures.    P:  1.) Alcohol dependence with alcohol withdrawal; patient reportedly had alcohol withdrawal seizures prior to presentation:   - Patient being monitored on CIWA protocol - patient last required valium on 12-Mar-2023 at 1719 hrs.   - Further rehabilitation as outpatient.    2.) Depression: Patient on fluoxetine 40 mg daily. Will consults Psychiatry to determine if patient requires transfer to psychiatric unit.    3.) Hyponatremia, likely secondary to poor oral intake and diarrhea: Will stop IV fluids and recheck labs.    4.) Hypokalemia, hypomagnesemia, hypophosphatemia; likely secondary to poor oral intake and diarrhea: Supplementing as needed.    5.) Transaminitis and hyperbilirubinemia; likely secondary to alcohol: Npo indication for steroids. Monitoring labs.       Diet: Combination Diet Regular Diet Adult  Snacks/Supplements Adult: Ensure Clear; With Meals    DVT Prophylaxis: Pneumatic compression device  Lara Catheter: Not present  Lines: None     Cardiac Monitoring: None  Code Status: Full Code      Clinically Significant Risk Factors        # Hypokalemia: Lowest K = 3.2 mmol/L in last 2 days, will replace as needed     # Hypomagnesemia: Lowest Mg = 1.2 mg/dL in last 2 days, will replace as needed                     Disposition Plan     Expected Discharge Date: 03/16/2023                  Skip Chu MD  Hospitalist Service, GOLD TEAM 16  [facility]  Securely message with [employer] (more info)  Text page via [employer]  Paging/Directory   See signed in provider for up to date coverage information  ______________________________________________________________________    Interval History     Patient noted tremor improved. Patient noted no fever, no chills, no nausea and no diarrhea. Patient noted feeling anxious.    Physical Exam   Vital Signs: Temp: 98.4  F (36.9  C) Temp src: Oral BP: 107/61 Pulse: 89   Resp: 16 SpO2: 96 % O2 Device: None (Room air)    Weight: 162 lbs 7.66 oz    General: Patient comfortable, NAD. Mild tremor present.  Heart: RRR, S1 S2 w/o murmurs.  Lungs: Breath sounds present. No crackles/wheezes heard.  Abdomen: Soft, nontender.    Labs noted.  Sodium 140; Potassium 4.0; Creatinine 0.66;  ; ; Alkaline Phosphatase 75; Total Bilirubin 1.4  WBC 4.4; Hb 13.6; Platelets 143      Medical Decision Making

## 2023-03-14 NOTE — PROGRESS NOTES
"CLINICAL NUTRITION SERVICES - ASSESSMENT NOTE     Nutrition Prescription    RECOMMENDATIONS FOR MDs/PROVIDERS TO ORDER:  None - pt is on appropriate supplements for ETOH abuse     Malnutrition Status:    Patient does not meet two of the established criteria necessary for diagnosing malnutrition     Recommendations already ordered by Registered Dietitian (RD):  Updated supplement order to berry Ensure Clear BID at lunch and dinner meals    Future/Additional Recommendations:  Monitor meal intakes/supplement acceptance, wt/lab trends      REASON FOR ASSESSMENT  Vianey Whitaker is a/an 32 year old male assessed by the dietitian for Provider Order - Please evaluate for malnutrition and advice    NUTRITION/MEDICAL HISTORY  Per chart review: Pt admits to hospital in the setting of alcohol dependency, severe alcohol withdrawal, and alcohol withdrawal seizure. Other past medical history noted for depression and anxiety, alcohol dependency, previous history of severe withdrawals, more recently requiring intubation.     Per pt visit: RD visited pt at bedside, reviewed weight trends below and intakes prior to admission, pt notes at least taking 1 meal per day and maybe some snacks, but due to above, po was declining as pt notes \"couldn't keep anything down\". Pt reports now feeling well and tolerating meals well. RD reviewed the importance of adequate nutritional intakes and how drinking and not eating impacts health status, reviewed supplement options, pt agreeing to above, was provided coupons for supplement for home.     CURRENT NUTRITION ORDERS  Diet: Regular  Supplement: Ensure Clear between meals   Intake/Tolerance: 100% per flow sheets, good appetite per recent nursing notes     LABS  Labs reviewed    MEDICATIONS  Prozac, Remeron, Clonidine, Folvite, Mag-ox, Phos tab, K+ replaced, NS IVF at 125 ml/hr, MVI, Pepcid, Thiamine     ANTHROPOMETRICS  182.9 cm (6') 12/01/2022 - per CE   Most Recent Weight: 73.7 kg (162 lb 7.7 oz)  "   IBW: 80.9 kg  BMI: Normal BMI (22 with above ht from CE)  UBW: Pt reports around 168 lbs  Weight History:   78.8 kg (173 lb 11.6 oz) 12/01/2022 - per CE     Weight assessment: Non-significant 6.3% weight loss in 3 months, no other history available to assess.     Dosing Weight: 73.7 kg    ASSESSED NUTRITION NEEDS  Estimated Energy Needs: 6452-6358 kcals/day (25 - 30 kcals/kg)  Justification: Maintenance  Estimated Protein Needs: 70 grams protein/day (1 grams of pro/kg)  Justification: Maintenance  Estimated Fluid Needs: 1 mL/kcal  Justification: Maintenance    MALNUTRITION  % Intake: Decreased intake does not meet criteria  % Weight Loss: Weight loss does not meet criteria  Subcutaneous Fat Loss: None observed  Muscle Loss: None observed  Fluid Accumulation/Edema: None noted  Malnutrition Diagnosis: Patient does not meet two of the established criteria necessary for diagnosing malnutrition     NUTRITION DIAGNOSIS  Predicted inadequate nutrient intake related to ETOH abuse as evidenced by pt reports/diagnosis       INTERVENTIONS  Implementation  Nutrition Education: RD reviewed the importance of adequate nutritional intakes to improve health status   Medical food supplement therapy - ordered as above      Goals  Patient to consume % of nutritionally adequate meal trays TID, or the equivalent with supplements/snacks.     Monitoring/Evaluation  Progress toward goals will be monitored and evaluated per protocol.    aSde Galicia RD, CNSC, LD  [facility] [place] 6 Med/Surg RD pager: [phone]

## 2023-03-14 NOTE — PLAN OF CARE
Goal Outcome Evaluation:       VS: /61 (BP Location: Right arm)   Pulse 89   Temp 98.4  F (36.9  C) (Oral)   Resp 16   Wt 73.7 kg (162 lb 7.7 oz)   SpO2 96%      O2: Stable on room air >90%   Output: Voids without difficulty, urinal at bedside   Last BM: 3/14/23   Activity: Independent   Up for meals? Yes   Skin: Visible skin intact   Pain: Denies pain   CMS: AO x4, denies SOB, chest pain, numbness/tingling   Dressing: N/A   Diet: Regular diet   LDA: Lt PIV SL   Equipment: IV pole, call light, personal belongings   Plan: Continue with POC   Additional Info: Pt reports dizziness after NS was discontinued, Provider Vlad notified, orthostatic BP checked 113/74, continue to monitor and recheck 3/15/23 AM.

## 2023-03-14 NOTE — PROGRESS NOTES
"/63 (BP Location: Right arm)   Pulse 78   Temp 98.1  F (36.7  C) (Oral)   Resp 18   Ht 1.778 m (5' 10\")   Wt 76.1 kg (167 lb 12.3 oz)   SpO2 98%   BMI 24.07 kg/m      VSS, calm and cooperative, A&Ox4. Denies chest pain, SOB, n/v, numbness/tingling, and dizziness  LS clear, on room air  BS active, LBM 3/14. Voids spontaneously w/o difficulty  Independent  Skin intact  L PIV SL  Pt denies any pain  Regular diet, able to make needs known.   Call light in reach, continue POC    CIWA  @1559, scored 0  @2047, scored 0    "

## 2023-03-15 LAB
ALBUMIN SERPL BCG-MCNC: 3.9 G/DL (ref 3.5–5.2)
ALP SERPL-CCNC: 83 U/L (ref 40–129)
ALT SERPL W P-5'-P-CCNC: 163 U/L (ref 10–50)
ANION GAP SERPL CALCULATED.3IONS-SCNC: 9 MMOL/L (ref 7–15)
AST SERPL W P-5'-P-CCNC: 230 U/L (ref 10–50)
BILIRUB SERPL-MCNC: 0.7 MG/DL
BUN SERPL-MCNC: 2.9 MG/DL (ref 6–20)
CALCIUM SERPL-MCNC: 9.4 MG/DL (ref 8.6–10)
CHLORIDE SERPL-SCNC: 102 MMOL/L (ref 98–107)
CREAT SERPL-MCNC: 0.75 MG/DL (ref 0.67–1.17)
DEPRECATED HCO3 PLAS-SCNC: 28 MMOL/L (ref 22–29)
GFR SERPL CREATININE-BSD FRML MDRD: >90 ML/MIN/1.73M2
GLUCOSE SERPL-MCNC: 129 MG/DL (ref 70–99)
HOLD SPECIMEN: NORMAL
MAGNESIUM SERPL-MCNC: 2.1 MG/DL (ref 1.7–2.3)
PHOSPHATE SERPL-MCNC: 4.4 MG/DL (ref 2.5–4.5)
POTASSIUM SERPL-SCNC: 4.4 MMOL/L (ref 3.4–5.3)
PROT SERPL-MCNC: 6.5 G/DL (ref 6.4–8.3)
SODIUM SERPL-SCNC: 139 MMOL/L (ref 136–145)

## 2023-03-15 PROCEDURE — 36415 COLL VENOUS BLD VENIPUNCTURE: CPT | Performed by: INTERNAL MEDICINE

## 2023-03-15 PROCEDURE — 120N000002 HC R&B MED SURG/OB UMMC

## 2023-03-15 PROCEDURE — 83735 ASSAY OF MAGNESIUM: CPT

## 2023-03-15 PROCEDURE — 99232 SBSQ HOSP IP/OBS MODERATE 35: CPT | Performed by: INTERNAL MEDICINE

## 2023-03-15 PROCEDURE — 80053 COMPREHEN METABOLIC PANEL: CPT | Performed by: INTERNAL MEDICINE

## 2023-03-15 PROCEDURE — 250N000013 HC RX MED GY IP 250 OP 250 PS 637: Performed by: INTERNAL MEDICINE

## 2023-03-15 PROCEDURE — 250N000013 HC RX MED GY IP 250 OP 250 PS 637: Performed by: PSYCHIATRY & NEUROLOGY

## 2023-03-15 PROCEDURE — H0001 ALCOHOL AND/OR DRUG ASSESS: HCPCS

## 2023-03-15 PROCEDURE — 84100 ASSAY OF PHOSPHORUS: CPT | Performed by: INTERNAL MEDICINE

## 2023-03-15 RX ADMIN — CLONIDINE HYDROCHLORIDE 0.1 MG: 0.1 TABLET ORAL at 08:12

## 2023-03-15 RX ADMIN — FOLIC ACID 1 MG: 1 TABLET ORAL at 08:12

## 2023-03-15 RX ADMIN — GABAPENTIN 600 MG: 300 CAPSULE ORAL at 22:28

## 2023-03-15 RX ADMIN — FLUOXETINE 40 MG: 20 CAPSULE ORAL at 08:12

## 2023-03-15 RX ADMIN — CLONIDINE HYDROCHLORIDE 0.1 MG: 0.1 TABLET ORAL at 22:28

## 2023-03-15 RX ADMIN — CLONIDINE HYDROCHLORIDE 0.1 MG: 0.1 TABLET ORAL at 16:19

## 2023-03-15 RX ADMIN — GABAPENTIN 900 MG: 300 CAPSULE ORAL at 08:13

## 2023-03-15 RX ADMIN — MAGNESIUM OXIDE TAB 400 MG (241.3 MG ELEMENTAL MG) 400 MG: 400 (241.3 MG) TAB at 08:13

## 2023-03-15 RX ADMIN — FAMOTIDINE 20 MG: 20 TABLET ORAL at 19:49

## 2023-03-15 RX ADMIN — DIBASIC SODIUM PHOSPHATE, MONOBASIC POTASSIUM PHOSPHATE AND MONOBASIC SODIUM PHOSPHATE 250 MG: 852; 155; 130 TABLET ORAL at 19:50

## 2023-03-15 RX ADMIN — GABAPENTIN 900 MG: 300 CAPSULE ORAL at 16:18

## 2023-03-15 RX ADMIN — MULTIPLE VITAMINS W/ MINERALS TAB 1 TABLET: TAB at 08:12

## 2023-03-15 RX ADMIN — DIBASIC SODIUM PHOSPHATE, MONOBASIC POTASSIUM PHOSPHATE AND MONOBASIC SODIUM PHOSPHATE 250 MG: 852; 155; 130 TABLET ORAL at 08:13

## 2023-03-15 RX ADMIN — DIBASIC SODIUM PHOSPHATE, MONOBASIC POTASSIUM PHOSPHATE AND MONOBASIC SODIUM PHOSPHATE 250 MG: 852; 155; 130 TABLET ORAL at 13:52

## 2023-03-15 RX ADMIN — MIRTAZAPINE 15 MG: 15 TABLET, FILM COATED ORAL at 22:28

## 2023-03-15 RX ADMIN — FAMOTIDINE 20 MG: 20 TABLET ORAL at 08:13

## 2023-03-15 RX ADMIN — THIAMINE HCL TAB 100 MG 100 MG: 100 TAB at 08:12

## 2023-03-15 RX ADMIN — MAGNESIUM OXIDE TAB 400 MG (241.3 MG ELEMENTAL MG) 400 MG: 400 (241.3 MG) TAB at 19:50

## 2023-03-15 ASSESSMENT — ACTIVITIES OF DAILY LIVING (ADL)
ADLS_ACUITY_SCORE: 38

## 2023-03-15 NOTE — PLAN OF CARE
Pt is A&OX4, calm and cooperative,Denies chest pain, SOB, n/v, numbness/tingling, and dizziness  LS clear, on room air  BS active, LBM 3/14. Voids spontaneously w/o difficulty  Independent  Skin intact  L PIV SL  Pt denies any pain  Regular diet, able to make needs known.   Call light in reach, continue POC

## 2023-03-15 NOTE — PROGRESS NOTES
"SPIRITUAL HEALTH SERVICES Progress Note  [facility] 6B    Saw pt Vianey Whitaker per follow up visit.    Patient/Family Understanding of Illness and Goals of Care - Vianey was visited yesterday by our on call  and I offered a follow up visit. He is hoping to be discharged in the near future for home.     Distress and Loss - Vianey shared about the challenging relationship he has with his mother and current unhealthy environment living with his father as his dad is an alcoholic. At the time of our visit, Vianey had been looking into a place he could move into/away from his current living situation. I named my concern that he is trying to go through sobriety alone and there is help available (support groups like AA).     Strengths, Coping, and Resources - Vianey's sister is supportive and brought flowers. He also has some ideas and a vision for what he would like his future to look like. He enjoys architecture and buildings and art.     Meaning, Beliefs, and Spirituality - Vianey shared about several trips he has taken, including [place] where he ventured to [facility] and had a powerful experience exploring the [employer] and [facility]. His spirituality seems connected to \"place\" and lately has been uprooted--naming that many of his boxes where he currently lives with his dad are unpacked. He also mentioned growing up [employer] and finding the beauty and meaning in many other faiths--Sikhism, Restorationism, and Christian to name a few. He shared that he would like to explore a year or two of services ([employer], volunteering, etc.). I offered some resources and also named that sometimes we can pursue a year of service as an escape from something that needs to be dealt with in our lives here (speaking from my own experience). We can have a very meaningful time serving and learning and growing but take the same challenges with us or return home and still have to deal with them. Vianey also expressed interest in finding support " groups on the [facility] or nearby to help with sobriety.     Plan of Care - I shared Vianey's request for sobriety resources and help with unit , Nancy. She will follow up with him. I will also send him information to a young adult volunteer fair I know is being hosted in the near future at [facility] in [place], [place]. Depending on length of stay, I will follow up with patient early next week.     Ranjith Gallo MDiv  Chaplain Resident  Pager [phone]    * [facility] remains available 24/7 for emergent requests/referrals, either by having the switchboard page the on-call  or by entering an ASAP/STAT consult in Epic (this will also page the on-call ). Routine Epic consults receive an initial response within 24 hours.*

## 2023-03-15 NOTE — PROGRESS NOTES
Brief Social Work Note      SW asked to obtain ROIs for possible inpatient treatment facility. SW met with patient at bedside. Patient is unsure at this time whether he wants to go to inpatient treatment but is considering it. He signed ROIs. BLAZE scanned and sent to [facility].      Nancy Leonardo, JADENW, LSW  6 Med Surg   [facility]  Phone: [phone]  Pager: [phone]
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
## 2023-03-15 NOTE — PROGRESS NOTES
[facility]    Medicine Progress Note - Hospitalist Service, GOLD TEAM 16    Date of Admission:  3/12/2023    Assessment & Plan     A: Patient is a 31 y/o man who has depression, anxiety and alcohol dependence with a history of severe withdrawal, more recently requiring intubation. Patient presented to the hospital after voluntarily trying to quit alcohol at home and having recurrent seizures.     P:  1.) Alcohol dependence with alcohol withdrawal; patient reportedly had alcohol withdrawal seizures prior to presentation:   - Patient being monitored on CIWA protocol - patient last required valium on 12-Mar-2023 at 1719 hrs.   - Patient met with chemical dependency today and is considering treatment programs.     2.) Depression: Patient on fluoxetine 40 mg daily. Will consults Psychiatry to determine if patient requires transfer to psychiatric unit.     3.) Hyponatremia, likely secondary to poor oral intake and diarrhea: Will stop IV fluids and monitor labs.     4.) Hypokalemia, hypomagnesemia, hypophosphatemia; likely secondary to poor oral intake and diarrhea: Supplementing as needed.     5.) Transaminitis and hyperbilirubinemia; likely secondary to alcohol:   - AST and ALT lower today and bilirubin level has normalized.  - No indication for steroids. Monitoring labs.          Diet: Combination Diet Regular Diet Adult  Snacks/Supplements Adult: Ensure Clear; With Meals    Lara Catheter: Not present  Lines: None     Cardiac Monitoring: None  Code Status: Full Code      Clinically Significant Risk Factors                                Disposition Plan     Expected Discharge Date: 03/16/2023                  Skip Chu MD  Hospitalist Service, GOLD TEAM 16  [facility]  Securely message with [employer] (more info)  Text page via [address] Paging/Directory   See signed in provider for up to date coverage  information  ______________________________________________________________________    Interval History     Patient noted no tremor and noted anxiety improved. Patient noted no new problems. Patient stated that he was considering chemical dependency treatment programs.    Physical Exam   Vital Signs: Temp: 98.1  F (36.7  C) Temp src: Oral BP: 109/66 Pulse: 86   Resp: 18 SpO2: 100 % O2 Device: None (Room air)    Weight: 167 lbs 12.32 oz    General: Patient comfortable, NAD. Appeared mildly anxious.  Heart: RRR, S1 S2 w/o murmurs.  Lungs: Breath sounds present. No crackles/wheezes heard.  Abdomen: Soft, nontender,      Labs noted. Sodium 139; Potassium 4.4; Creatinine 0.75; ; ;   Phosphorus level pending.    Medical Decision Making

## 2023-03-15 NOTE — CONSULTS
3/15/2023    CD consult completed.   Pt verbalized willingness to go to residential treatment, but wants to think about referrals.   Will send options/ROIs to [facility] for pt to decide about moving forward with referrals.     Recommendations:   1. Abstain from all non-prescribed mood-altering substances  2. Take all medications as prescribed  3. Enter and complete a MICD residential or inpatient treatment program  4. Follow all recommendations upon discharge from treatment. Recommendations may include, but are not limited to: extended treatment, outpatient treatment and/or sober housing.  5. Follow all recommendations of your medical and mental health providers     Clinical Substantiation:  Patient has been unable to maintain abstinence from alcohol while living at his current home environment and lacks a sober living environment. Patient lives with his father, who also drinks (reports that father is stopping drinking due to medical issues). Patient lacks long-term sober maintenance skills, lacks sober coping skills, lacks a sober peer support network and has dual issues of mental health and substance use.  Patient has never had any formal MELODIE treatment. Patient does not have any daily structure (not employed nor in school) and would benefit from a residential setting as his current living situation is not fully supported for recovery at this time.      Referrals/Alternatives:  [facility] for Referrals  Phone: [phone] or [phone]  Fax: [phone]  Admissions email: [email or web address]  [address] [address]  Phone: [phone]  Fax: [phone]     [facility]  [address] [place] [address]  Phone: [phone]  Fax: [phone]     [address] [address]  Phone: [phone]  Fax: [phone]     [facility]  Main phone: [phone]  Direct Dial: [phone]   Admissions email:  [email or web address]   MELODIE fax: [phone]  [email or web address]     MELODIE consult completed by: Daisy Villanueva [facility].  Phone Number: [phone]  E-mail Address: @[facility]  [address] [place] [address]     *Due to regulation of Title 42 of the Code of Federal Regulations (CFR) Part 2: Confidentiality laws apply to this note and the information wherein.  Thus, this note cannot be copy and pasted into any other health care staff's note nor can it be included in general medical records sent to ANY outside agency without the patient's written consent.

## 2023-03-15 NOTE — PROGRESS NOTES
"[id number]    Plan of Care Reviewed With: Patient    Overall Patient Progress: No Change    Outcome Evaluation: Pt is A & O x4 on RA. Denies pain, nausea, chest pain & SOB. Pt has L PIV - SL. Pt is independent, voids spontaneously & uses call light appropriately.     CIWA    CIWA score of 0 at 0816 & 1229.    Vitals: /69 (BP Location: Left arm)   Pulse 91   Temp 97.7  F (36.5  C) (Oral)   Resp 16   Ht 1.778 m (5' 10\")   Wt 76.1 kg (167 lb 12.3 oz)   SpO2 97%   BMI 24.07 kg/m      Plan: TBD, possible discharge 3/16. Continue w/ plan of care.   "

## 2023-03-15 NOTE — TELEPHONE ENCOUNTER
Records Requested     March 15, 2023 11:11 AM   [id number]   Facility  [facility]   Outcome 11:15am Sent imaging request to be pushed to PACs. -ROSETTE Helton on 4/6/2023 at 3:15 PM Imaging has been resolved into PACS. -ROSETTE       RECORDS RECEIVED FROM:  Care Everywhere   REASON FOR VISIT: Dizziness, balance   Date of Appt: 6/7/23   NOTES (FOR ALL VISITS) STATUS DETAILS   OFFICE NOTE from referring provider Care Everywhere 12/21/22 Juan Luis Lang MD @ [facility]   OFFICE NOTE from other specialist Care Everywhere 2/17/22 No Lake MD @[facility]    MEDICATION LIST Care Everywhere    IMAGING  (FOR ALL VISITS)     CT (HEAD, NECK, SPINE) In process [facility]  3/12/23 CT Cervical Spine  3/12/23 CT Head    [facility]   11/30/22 CT Cervical Spine  11/30/22 CT Head

## 2023-03-15 NOTE — CONSULTS
Dental Service Consultation      Vianey Whitaker MRN# [id number]  YOB: 1990 Age: 32 year old  Date of Admission: 3/12/2023    Reason for consult: I was asked by Perico Lau MD to evaluate this patient for lesion on tongue.    Chief Complaint:  Pain and discomfort from lesion on tongue x 4 months after initial trauma.    Assessment and Plan:  Assessment:   Radiographic exam: no dental CT was taken.   Extraoral exam: NC/AT, EOMI, PERRL, No submandibular lymphadenopathy, no induration or erythema, no abnormal skin lesions, inferior border of mandible is palpable bilaterally, BHARGAVI >45mm. No TMJ discomfort bilaterally. FOM soft and non tender. No clicking of TMJ on opening and lateral movements.     Intraoral exam: Reveals decay and heavy plaque. [id number] solitary round mass on the left lateral border of the tongue, left lateral border of the tongue is scalloped and pattern consistent with the occlusion. non erythematous, same color as tongue mucosa, non exophytic mass,  Firm, tenderness to palpation. No fixation to the underlying structure, no pulsation, no fluctuation,  No drainage, no ulceration, a small tissue tag hanging from the mass. No submandibular lymph node is palpable. Mallampati II. BHARGAVI ~45mm. No sharp edge from adjacent tooth.     Plan:  - DDx includes fibroma, repeated trauma and constant irritation from tongue biting during his alcohol withdrawal symptoms.  - Pt can follow up with his dental home for excisional removal if the lesion keeps irritating him OR he can also follow up with [facility] dental clinic.  - If he wants to be seen at [facility] clinic, medical team are responsible for scheduling appointment for him. (scheduling team of [facility] will be notified). Patient is aware that they will not be sedated and that there is only nitrous oxide and local anesthesia that the clinic offers.   - Please call [phone] for appointment and make the arrangements for transportation. If  any medical personnel or security needs to accompany patient, please arrange so.       Clinic information:   [facility]  [address], [place] [address] [phone]      History is obtained from the patient      History of Present Illness:  Vianey Whitaker is a 32 year old male admitted on 3/12/2023. He has a known h/o depression and anxiety, alcohol dependency, previous history of severe withdrawals, mor recently requiring intubation, who presents to the hospital after voluntarily trying to quit alcohol at home, and having recurrent seizures, generally feeling unwell. Complained of a lump on his left lateral tongue which was first caused by biting on his tongue during seizures from withdrawal. He reported since then, Its been irritating and he's been biting on it frequently when he closes his jaw. Denies any fever,chills, drainage from the lesion.     Past Medical History:  Past Medical History:   Diagnosis Date     Alcohol dependence (H)      Alcohol withdrawal seizure (H)      Anxiety      Major depression        Past Surgical History:  No past surgical history on file.    Social History:  Social History     Tobacco Use     Smoking status: Never     Smokeless tobacco: Never   Substance Use Topics     Alcohol use: Yes     Alcohol/week: 7.0 - 8.0 standard drinks     Types: 7 - 8 Shots of liquor per week     Comment: 2 full glasses of whiseky every day       Family History:  No family history on file.    Immunizations:  Immunization History   Administered Date(s) Administered     COVID-19 Vaccine 12+ (Pfizer 2022) 02/17/2022       Allergies:  Allergies   Allergen Reactions     Ceftriaxone Shortness Of Breath and Rash     Received first dose of antibiotic on 12/2 and immediately after pt found to be red all over and had spots of macularpapular rash       Medications:  Current Facility-Administered Medications Ordered in Epic   Medication Dose Route Frequency Last Rate  Last Admin     cloNIDine (CATAPRES) tablet 0.1 mg  0.1 mg Oral Q8H   0.1 mg at 03/15/23 0812     diazepam (VALIUM) tablet 10 mg  10 mg Oral Q30 Min PRN   10 mg at 03/12/23 1719    Or     diazepam (VALIUM) injection 5-10 mg  5-10 mg Intravenous Q30 Min PRN         famotidine (PEPCID) tablet 20 mg  20 mg Oral BID   20 mg at 03/15/23 0813     flumazenil (ROMAZICON) injection 0.2 mg  0.2 mg Intravenous q1 min prn         FLUoxetine (PROzac) capsule 40 mg  40 mg Oral Daily   40 mg at 03/15/23 0812     folic acid (FOLVITE) tablet 1 mg  1 mg Oral Daily   1 mg at 03/15/23 0812     [START ON 3/19/2023] gabapentin (NEURONTIN) capsule 100 mg  100 mg Oral Q8H         [START ON 3/17/2023] gabapentin (NEURONTIN) capsule 300 mg  300 mg Oral Q8H         gabapentin (NEURONTIN) capsule 600 mg  600 mg Oral Q8H         gabapentin (NEURONTIN) capsule 900 mg  900 mg Oral Q8H   900 mg at 03/15/23 0813     OLANZapine zydis (zyPREXA) ODT tab 5-10 mg  5-10 mg Oral Q6H PRN        Or     haloperidol lactate (HALDOL) injection 2.5-5 mg  2.5-5 mg Intravenous Q6H PRN         lidocaine (LMX4) cream   Topical Q1H PRN         lidocaine 1 % 0.1-1 mL  0.1-1 mL Other Q1H PRN         loperamide (IMODIUM) capsule 2 mg  2 mg Oral 4x Daily PRN         magic mouthwash suspension (diphenhydramine, lidocaine, aluminum-magnesium & simethicone)  10 mL Swish & Swallow Q6H PRN         magnesium oxide (MAG-OX) tablet 400 mg  400 mg Oral BID   400 mg at 03/15/23 0813     melatonin tablet 5 mg  5 mg Oral QPM PRN         mirtazapine (REMERON) tablet 15 mg  15 mg Oral At Bedtime   15 mg at 03/14/23 2229     multivitamin w/minerals (THERA-VIT-M) tablet 1 tablet  1 tablet Oral Daily   1 tablet at 03/15/23 0812     ondansetron (ZOFRAN ODT) ODT tab 4 mg  4 mg Oral Q6H PRN        Or     ondansetron (ZOFRAN) injection 4 mg  4 mg Intravenous Q6H PRN         phosphorus tablet 250 mg (PHOSPHA 250 NEUTRAL) per tablet 250 mg  250 mg Oral TID   250 mg at 03/15/23 1352     sodium  chloride (PF) 0.9% PF flush 3 mL  3 mL Intracatheter Q8H   3 mL at 03/15/23 0816     sodium chloride (PF) 0.9% PF flush 3 mL  3 mL Intracatheter q1 min prn         thiamine (B-1) tablet 100 mg  100 mg Oral Daily   100 mg at 03/15/23 0812     No current [facility]-ordered outpatient medications on file.       Review of Systems:  The 10 point Review of Systems is negative other than noted in the HPI    Physical Exam:  Vitals were reviewed  Temp: 97.7  F (36.5  C) Temp src: Oral BP: 103/69 Pulse: 91   Resp: 16 SpO2: 97 % O2 Device: None (Room air)          Head and neck exam:  HEENT: NC/AT, EOMI, PERRL, No submandibular lymphadenopathy, no induration or erythema, no abnormal skin lesions, inferior border of mandible is palpable bilaterally, BHARGAVI >45mm. No TMJ discomfort bilaterally. FOM soft and non tender. No clicking of TMJ on opening and lateral movements.        Jorge Vila  PGY1  Pager: [phone]

## 2023-03-16 VITALS
BODY MASS INDEX: 24.02 KG/M2 | HEART RATE: 74 BPM | DIASTOLIC BLOOD PRESSURE: 66 MMHG | RESPIRATION RATE: 16 BRPM | WEIGHT: 167.77 LBS | OXYGEN SATURATION: 99 % | TEMPERATURE: 97.6 F | HEIGHT: 70 IN | SYSTOLIC BLOOD PRESSURE: 104 MMHG

## 2023-03-16 LAB
ALBUMIN SERPL BCG-MCNC: 3.8 G/DL (ref 3.5–5.2)
ALP SERPL-CCNC: 77 U/L (ref 40–129)
ALT SERPL W P-5'-P-CCNC: 196 U/L (ref 10–50)
ANION GAP SERPL CALCULATED.3IONS-SCNC: 9 MMOL/L (ref 7–15)
AST SERPL W P-5'-P-CCNC: 257 U/L (ref 10–50)
BILIRUB SERPL-MCNC: 0.5 MG/DL
BUN SERPL-MCNC: 4.5 MG/DL (ref 6–20)
CALCIUM SERPL-MCNC: 9.4 MG/DL (ref 8.6–10)
CHLORIDE SERPL-SCNC: 100 MMOL/L (ref 98–107)
CREAT SERPL-MCNC: 0.65 MG/DL (ref 0.67–1.17)
DEPRECATED HCO3 PLAS-SCNC: 29 MMOL/L (ref 22–29)
GFR SERPL CREATININE-BSD FRML MDRD: >90 ML/MIN/1.73M2
GLUCOSE SERPL-MCNC: 119 MG/DL (ref 70–99)
HOLD SPECIMEN: NORMAL
MAGNESIUM SERPL-MCNC: 2 MG/DL (ref 1.7–2.3)
POTASSIUM SERPL-SCNC: 4.2 MMOL/L (ref 3.4–5.3)
PROT SERPL-MCNC: 6.4 G/DL (ref 6.4–8.3)
SODIUM SERPL-SCNC: 138 MMOL/L (ref 136–145)

## 2023-03-16 PROCEDURE — 250N000013 HC RX MED GY IP 250 OP 250 PS 637: Performed by: INTERNAL MEDICINE

## 2023-03-16 PROCEDURE — 36415 COLL VENOUS BLD VENIPUNCTURE: CPT | Performed by: INTERNAL MEDICINE

## 2023-03-16 PROCEDURE — 80053 COMPREHEN METABOLIC PANEL: CPT | Performed by: INTERNAL MEDICINE

## 2023-03-16 PROCEDURE — 99239 HOSP IP/OBS DSCHRG MGMT >30: CPT | Performed by: INTERNAL MEDICINE

## 2023-03-16 PROCEDURE — 83735 ASSAY OF MAGNESIUM: CPT

## 2023-03-16 RX ORDER — FOLIC ACID 1 MG/1
1 TABLET ORAL DAILY
Qty: 30 TABLET | Refills: 0 | Status: SHIPPED | OUTPATIENT
Start: 2023-03-17 | End: 2024-08-14

## 2023-03-16 RX ORDER — MIRTAZAPINE 15 MG/1
15 TABLET, FILM COATED ORAL AT BEDTIME
Qty: 30 TABLET | Refills: 0 | Status: SHIPPED | OUTPATIENT
Start: 2023-03-16 | End: 2024-08-14

## 2023-03-16 RX ORDER — MAGNESIUM OXIDE 400 MG/1
400 TABLET ORAL EVERY 4 HOURS
Status: COMPLETED | OUTPATIENT
Start: 2023-03-16 | End: 2023-03-16

## 2023-03-16 RX ORDER — MULTIPLE VITAMINS W/ MINERALS TAB 9MG-400MCG
1 TAB ORAL DAILY
Qty: 30 TABLET | Refills: 0 | Status: SHIPPED | OUTPATIENT
Start: 2023-03-17 | End: 2024-08-14

## 2023-03-16 RX ADMIN — CLONIDINE HYDROCHLORIDE 0.1 MG: 0.1 TABLET ORAL at 06:25

## 2023-03-16 RX ADMIN — MULTIPLE VITAMINS W/ MINERALS TAB 1 TABLET: TAB at 08:45

## 2023-03-16 RX ADMIN — GABAPENTIN 600 MG: 300 CAPSULE ORAL at 06:25

## 2023-03-16 RX ADMIN — THIAMINE HCL TAB 100 MG 100 MG: 100 TAB at 08:46

## 2023-03-16 RX ADMIN — MAGNESIUM OXIDE TAB 400 MG (241.3 MG ELEMENTAL MG) 400 MG: 400 (241.3 MG) TAB at 09:57

## 2023-03-16 RX ADMIN — FOLIC ACID 1 MG: 1 TABLET ORAL at 08:46

## 2023-03-16 RX ADMIN — DIBASIC SODIUM PHOSPHATE, MONOBASIC POTASSIUM PHOSPHATE AND MONOBASIC SODIUM PHOSPHATE 250 MG: 852; 155; 130 TABLET ORAL at 08:44

## 2023-03-16 RX ADMIN — FAMOTIDINE 20 MG: 20 TABLET ORAL at 08:45

## 2023-03-16 RX ADMIN — FLUOXETINE 40 MG: 20 CAPSULE ORAL at 08:45

## 2023-03-16 RX ADMIN — MAGNESIUM OXIDE TAB 400 MG (241.3 MG ELEMENTAL MG) 400 MG: 400 (241.3 MG) TAB at 12:58

## 2023-03-16 RX ADMIN — MAGNESIUM OXIDE TAB 400 MG (241.3 MG ELEMENTAL MG) 400 MG: 400 (241.3 MG) TAB at 08:45

## 2023-03-16 ASSESSMENT — ACTIVITIES OF DAILY LIVING (ADL)
ADLS_ACUITY_SCORE: 36
ADLS_ACUITY_SCORE: 38
ADLS_ACUITY_SCORE: 36
ADLS_ACUITY_SCORE: 38

## 2023-03-16 NOTE — PROGRESS NOTES
Care Management Discharge Note    Discharge Date: 03/16/2023       Discharge Disposition:  Home, then IP M[facility]IE Tx    Discharge Services:  MELODIE tx    Discharge DME:  None    Discharge Transportation:  Family or friend    Private pay costs discussed: Not applicable    PAS Confirmation Code:  N/A  Patient/family educated on Medicare website which has current facility and service quality ratings:  N/A    Education Provided on the Discharge Plan:  yes  Persons Notified of Discharge Plans: patient, care team  Patient/Family in Agreement with the Plan:  yes    Handoff Referral Completed: Yes    Additional Information:    SW met with patient at bedside. Patient's sister was also present. Patient stated that he has decided to go to IP MELODIE treatment. He and his sister asked some questions regarding treatment and the process.       11:45AM  SW met with patient and sister at bedside. Patient stated that he feels good about discharging today. He will be returning to his father's home and then admitting to a tx facility as soon as he can. Patient knows to follow up with facilities to find placement.     Referrals:    [facility] Access Team for Referrals  Phone: [phone] or [phone]  Fax: [phone]  [address] [address]  Admissions Phone: [phone]  Phone: [phone]  Fax: [phone]  3/16: SW called [facility] Team for Referrals. They stated that they have not reviewed the referral but will get back to .      [facility]  [address] [place] [address]  Phone: [phone]  Fax: [phone]  3/16: SW called and spoke with admissions. They stated that they have not reviewed referral.     [facility]   [address] [address] [place] [address]  Phone: [phone]  Fax: [phone]     [facility]  Main phone: [phone]  Direct Dial: [phone]   MELODIE fax: [phone]        Nancy Leonardo, BSW, LSW  6 Med Surg   [facility]  [facility]  Phone: [phone]  Pager: [phone]

## 2023-03-16 NOTE — PROGRESS NOTES
6MS DISCHARGE    D: Patient discharged to home at 1320 via wheelchair w/ transport. Patient accompanied by friend.    I: Discharge prescriptions given to patient. All discharge medications and instructions reviewed with pt. Patient instructed to seek care if experiencing worsening symptoms. Other phone numbers to call with questions or concerns after discharge reviewed. Education completed.    A: Pt verbalized understanding of discharge medications and instructions. Prescribed home medications given to patient. Belongings returned to patient.    P: Patient to follow-up w/ primary care provider within 7 days & neurologist on June 7th.

## 2023-03-16 NOTE — PROGRESS NOTES
"/66 (BP Location: Right arm)   Pulse 86   Temp 98.1  F (36.7  C) (Oral)   Resp 18   Ht 1.778 m (5' 10\")   Wt 76.1 kg (167 lb 12.3 oz)   SpO2 100%   BMI 24.07 kg/m       VSS, calm and cooperative, A&Ox4. Denies chest pain, SOB, n/v, numbness/tingling, and dizziness  LS clear, on room air  BS active, LBM 3/14. Voids spontaneously w/o difficulty  Independent  Skin intact  No IV access  Pt denies any pain  Regular diet, able to make needs known. Discharge possible 3/16  Call light in reach, continue POC      CIWA  Scored x2, both scores 0. No PRN Valium given per orders.   "

## 2023-03-16 NOTE — PLAN OF CARE
Goal Outcome Evaluation:  Alert and oriented X 4. Able to make needs known. Call light in reach. VSS. Maintaining O2 sats in upper 90s on room air. Denies pain. CIWA 0,   Tolerating PO. No nausea, headache, dizziness, lightheadedness, chest pain or SOB.  SBA to BR,  Continent of bowel and bladder. Appears to be sleeping most of night. Frequent rounding done. Seizure precautions in place. No new issues or concerns overnight.  Continue with of care.

## 2023-03-16 NOTE — PROGRESS NOTES
[facility]    Medicine Progress Note - Hospitalist Service, GOLD TEAM 16    Date of Admission:  3/12/2023    Assessment & Plan     A: Patient is a 33 y/o man who has depression, anxiety and alcohol dependence with a history of severe withdrawal, more recently requiring intubation. Patient presented to the hospital after voluntarily trying to quit alcohol at home and having recurrent seizures.     P:  1.) Alcohol dependence with alcohol withdrawal; patient reportedly had alcohol withdrawal seizures prior to presentation:   - Patient being monitored on CIWA protocol - patient last required valium on 12-Mar-2023 at 1719 hrs.   - Patient has met with chemical dependency today and is considering a residential treatment program.     2.) Depression: Patient on fluoxetine 40 mg daily. Remeron 15 mg nightly was added per Psychiatry recommendations. Per Psychiatry, patient does not require inpatient psychiatric admission.     3.) Hyponatremia, likely secondary to poor oral intake and diarrhea: Appears resolved. Labs to be rechecked as outpatient     4.) Hypokalemia, hypomagnesemia, hypophosphatemia; likely secondary to poor oral intake and diarrhea: Supplementing as needed.     5.) Transaminitis and hyperbilirubinemia; likely secondary to alcohol:   - AST and ALT higher today but bilirubin level has normalized.  - No indication for steroids.   - Patient to have CMP on 20-Mar-2023.       Diet: Combination Diet Regular Diet Adult  Snacks/Supplements Adult: Ensure Clear; With Meals    Lara Catheter: Not present  Lines: None     Cardiac Monitoring: None  Code Status: Full Code      Clinically Significant Risk Factors                                Disposition Plan      Expected Discharge Date: 03/16/2023                  Skip Chu MD  Hospitalist Service, GOLD TEAM 16  [facility]  Securely message with [employer] (more info)  Text page via  [employer] Paging/Directory   See signed in provider for up to date coverage information  ______________________________________________________________________    Interval History     Patient notes feeling well. Patient notes some anxiety. Patient notes no new problems. Patient stated that he was looking into residential treatment programs for his alcohol dependence.    Physical Exam   Vital Signs: Temp: 97.6  F (36.4  C) Temp src: Oral BP: 104/66 Pulse: 74   Resp: 16 SpO2: 99 % O2 Device: None (Room air)    Weight: 167 lbs 12.32 oz    General: Patient comfortable. NAD.  HEENT: No scleral icterus.  Heart: RRR, S1 S2 w/o murmurs.  Lungs: Breath sounds present. No crackles/wheezes heard.  Abdomen: Soft, nontender.    Labs noted.  Sodium 138; Potassium 4.2; Creatinine 0.65  ;     Medical Decision Making

## 2023-03-16 NOTE — PROGRESS NOTES
"[id number]  Plan of care reviewed: Patient  Overall patient progress: no change   Outcome evaluation: : Pt is A & O x4 on RA. Denies pain, nausea, chest pain & SOB. NO PIV. Pt is independent, voids spontaneously & uses call light appropriately.   CIWA:  CIWA score of 0 at 0846 and 1240    /66 (BP Location: Right arm)   Pulse 74   Temp 97.6  F (36.4  C) (Oral)   Resp 16   Ht 1.778 m (5' 10\")   Wt 76.1 kg (167 lb 12.3 oz)   SpO2 99%   BMI 24.07 kg/m    Plan: Today, home.  "

## 2023-03-17 NOTE — DISCHARGE SUMMARY
[facility]  Hospitalist Discharge Summary      Date of Admission:  12-Mar-2023  Date of Discharge:  16-Mar-2023  Discharging Provider: Skip Chu MD  Discharge Service: Hospitalist Service, GOLD TEAM 16    Discharge Diagnoses   1.) Alcohol dependence with alcohol withdrawal; possible alcohol withdrawal seizures prior to presentation  2.) Depression  3.) Hyponatremia  4.) Hypokalemia  5.) Hypomagnesemia  6.) Hypophosphatemia  7.) Transaminitis and hyperbilirubinemia      Follow-ups Needed After Discharge   Follow-up Appointments     Adult [facility] Follow-up and recommended labs and tests      Follow up with PCP within 1 week.  Follow up with chemical dependency treatment program as outpatient.    Appointments on [place] and/or [facility] (with [facility] or [facility]   provider or service). Call [phone] if you haven't heard regarding   these appointments within 7 days of discharge.             Unresulted Labs Ordered in the Past 30 Days of this Admission     No orders found from 2/10/2023 to 3/13/2023.          Discharge Disposition   - Patient was discharged to home.    Hospital Course   Patient is a 33 y/o man who has depression, anxiety and alcohol dependence with a history of severe withdrawal, more recently requiring intubation. Patient presented to the hospital after voluntarily trying to quit alcohol at home and having recurrent seizures.    1.) Alcohol dependence with alcohol withdrawal; patient reportedly had alcohol withdrawal seizures prior to presentation: Patient was monitored on CIWA protocol. Patient had no seizures during hospital stay. Alcohol withdrawal had resolved by the time of discharge.    2.) Depression: Patient was continued on fluoxetine 40 mg daily. Patient was seen by Psychiatry and was started on remeron 15 mg nightly as well.    3.) Hyponatremia: This likely was secondary to poor oral intake and diarrhea: This resolved by the time of  discharge.    4.) Hypokalemia, hypomagnesemia, hypophosphatemia; likely secondary to poor oral intake and diarrhea: Patient was supplemented during hospital stay.    5.) Transaminitis and hyperbilirubinemia: Hyperbilirubinemia had resolved by the time of discharge but patient still had a transaminitis. Patient to have f/u CMP on 20-Mar-2023.    Consultations This Hospital Stay   NUTRITION SERVICES ADULT IP CONSULT  PSYCHIATRY IP CONSULT  DENTISTRY ADULT IP CONSULT  CHEMICAL DEPENDENCY IP CONSULT  CHEMICAL DEPENDENCY IP CONSULT  PSYCHIATRY IP CONSULT  CHEMICAL DEPENDENCY IP CONSULT    Code Status   Prior    Time Spent on this Encounter   - Time spent discharging patient was 35 minutes.       Skip Chu MD  [facility] MED SURG  [facility] [address]  Phone: [phone]  Fax: [phone]  ______________________________________________________________________    Physical Exam   Vital Signs: Temp: 97.6  F (36.4  C) Temp src: Oral BP: 104/66 Pulse: 74   Resp: 16 SpO2: 99 % O2 Device: None (Room air)    Weight: 167 lbs 12.32 oz     General: Patient comfortable. NAD.  HEENT: No scleral icterus.  Heart: RRR, S1 S2 w/o murmurs.  Lungs: Breath sounds present. No crackles/wheezes heard.  Abdomen: Soft, nontender.          Primary Care Physician   Edwige Coffey    Discharge Orders      Comprehensive metabolic panel (BMP + Alb, Alk Phos, ALT, AST, Total. Bili, TP)    CMP on 20-Mar-2023: For transaminitis     Reason for your hospital stay    Alcohol withdrawal     Activity    Your activity upon discharge: As tolerated     Adult [facility] Follow-up and recommended labs and tests    Follow up with PCP within 1 week.  Follow up with chemical dependency treatment program as outpatient.    Appointments on [place] and/or [facility] (with [facility] or [facility] provider or service). Call [phone] if you haven't heard regarding these appointments within 7 days of discharge.     Diet     Follow this diet upon discharge: Regular diet       Significant Results and Procedures   - None    Discharge Medications   Discharge Medication List as of 3/16/2023 12:46 PM      START taking these medications    Details   mirtazapine (REMERON) 15 MG tablet Take 1 tablet (15 mg) by mouth At Bedtime, Disp-30 tablet, R-0, E-Prescribe      multivitamin w/minerals (THERA-VIT-M) tablet Take 1 tablet by mouth daily, Disp-30 tablet, R-0, E-Prescribe         CONTINUE these medications which have CHANGED    Details   folic acid (FOLVITE) 1 MG tablet Take 1 tablet (1 mg) by mouth daily, Disp-30 tablet, R-0, E-Prescribe         CONTINUE these medications which have NOT CHANGED    Details   famotidine (PEPCID) 20 MG tablet Take 20 mg by mouth daily, Historical      FLUoxetine (PROZAC) 40 MG capsule Take 40 mg by mouth daily, Historical         STOP taking these medications       LORazepam (ATIVAN) 1 MG tablet Comments:   Reason for Stopping:         naltrexone (DEPADE/REVIA) 50 MG tablet Comments:   Reason for Stopping:         thiamine (B-1) 100 MG tablet Comments:   Reason for Stopping:             Allergies   Allergies   Allergen Reactions     Ceftriaxone Shortness Of Breath and Rash     Received first dose of antibiotic on 12/2 and immediately after pt found to be red all over and had spots of macularpapular rash

## 2023-06-01 ENCOUNTER — HEALTH MAINTENANCE LETTER (OUTPATIENT)
Age: 33
End: 2023-06-01

## 2023-06-06 NOTE — PROGRESS NOTES
DEPARTMENT OF NEUROLOGY    Patient Name:  Vianey Whitaker  MRN:  [id number]    :  1990  Date of Clinic Visit:  2023  Primary Care Provider:  Edwige Coffey    Assessment:  Vianey Whitaker is a 32-year-old gentleman who presents to Neurology this afternoon for evaluation of balance issues.     #Truncal ataxia  #Diffuse cerebral and cerebellar atrophy  Exam today most remarkable for significant truncal ataxia with tandem walk.  This certainly may be related to his history of alcohol abuse, but he does have significant diffuse cerebral atrophy in addition to his cerebellar atrophy and further imaging with MRI is warranted.  It appears this imaging has already been ordered by his ophthalmologist so he was provided with the appropriate contact information to get this scheduled with our imaging centers.  - MRI brain (ordered by ophthalmology)    #Possible mild peripheral neuropathy  Hyperesthesia and tingling in feet per history. Exam with mildly reduced vibratory sensation and proprioception. Suspect mediated by alcohol toxicity but we will screen for nutritional deficiencies and metal toxicity.   - Check vitamins B1, B6, and B12 as well as a copper and zinc level    #Vision loss, bilateral, fluctuant  #Astigmatism  Not discussed at length during today's visit but will place referral requested by the patient's ophthalmologist.  - Referral placed to neuro-ophthalmology as outlined by prior ophthalmologist's recommendations    #Abnormal movements during sleep  #Snoring  Patient with a suspected family history of Parkinson's disease.  Per history, reports abnormal movements during sleep, sleep talking, and sleepwalking with occasional instances of waking up on the floor.  Patient would be best served by a in-lab PSG.  He also reports a history of snoring, rule out sleep apnea.  - Referral to sleep medicine     Patient has been seen with Dr. Soto who agrees with my assessment and plan.    Pradip Edwards,  "MD  [facility] Department of Neurology PGY-3    This note consists of symbols derived from keyboarding, dictation and/or voice recognition software. As a result, there may be errors in the document that have gone undetected. Please consider this when interpreting information presented here within.      ------------------------------------------------------------------------------------------------------------    Chief Complaint: Balance issues      HPI: Mr. Whitaker is a 32-year-old gentleman with a history of alcohol use disorder in sustained remission who presents to Neurology for evaluation of balance issues. He explains that his issues with balance for started about 5 years ago or so.  Specifically, he reports having trouble with spatial awareness, noting that he will occasionally trip over his feet.  He does report feeling wobbly when he walks at times and has had a few falls and near falls.  He does endorse some tingling and sensitivity in his feet, which he believes actually started a year ago.  Along with that, he has noticed that his balance seems to be somewhat worse when he is in dark environments.  He feels like his balance is also worse when he is physically active, noting that he feels \"top-heavy.\"    In terms of his alcohol use history, he explains that he started drinking several years ago, with his most significant alcohol intake coming during the COVID pandemic, explaining that he felt that he was self-medicating for his depression.  During the height of his drinking, he reports that he was ingesting about a half a liter of hard alcohol per day.  His last drink was 3 months ago.     With respect to family history, he notes that his father has a history of Churg-Yelitza, as well as undiagnosed parkinsonism.  He notes that his father has not had any difficulties with cognition, however, nor has the patient had any subjective difficulties with memory or cognition.  His first cousin on the " "paternal side has a history of multiple sclerosis.    Of note, the patient reports that he does talk in his sleep and has sleepwalked before.  He also reports that he has been told that he does \"gymnastics\" during sleep and has woken up on the floor before.  He has been told that he snores.      ROS: 10-point review of systems was negative except for as noted above.     Vital signs:                         Estimated body mass index is 24.07 kg/m  as calculated from the following:    Height as of 3/14/23: 1.778 m (5' 10\").    Weight as of 3/14/23: 76.1 kg (167 lb 12.3 oz).      Examination:   -General: No acute distress.  -Neurological:   --MS: Patient is alert, attentive, and oriented. Speech is clear and fluent. Names normally. Remote memory intact.   --CNs: Pupils symmetric, round and reactive to light. Visual fields are full. Ocular motility is full.  There is central beating nystagmus with lateral gaze to the right.  Muscles of mastication and facial expression normal. Hearing intact to conversation. Palate elevation normal. Shoulder shrug is strong and symmetric. Tongue motions normal.   --Motor: Normal muscle bulk. Muscle strength is 5/5 and symmetric with testing of the deltoids, biceps, triceps, , hip flexors, knee flexors/extensors, and ankle dorsi- and plantarflexion.  --Reflexes: 2+ and symmetric at biceps, BR, triceps, patellae and Achilles. Toes are downgoing.  Saritha sign is absent.  --Sensory: Patient has mildly reduced vibratory sensation at the level of the toes with normalization at the level of the medial malleoli.  Some difficulty with proprioception in the distal lower extremities.  Light touch, pinprick are normal.  Negative Romberg.  --Coordination: Heel-shin and finger-nose-finger are normal.  --Gait: Stands with feet normally spaced.  Casual gait is steady with normal arm swing bilaterally.  Significant difficulty with tandem walking, and to a lesser extent walking on " heels.      INVESTIGATIONS:  All available and relevant labs, imaging, and other procedures were reviewed with the patient at this visit. Those of particular significance are listed below:    CT Head (3/12/2023): There is diffuse cerebral volume loss that is greater than expected for the patient's age as well as considerable volume loss in the cerebellum. Please see radiologist report for their impression.

## 2023-06-07 ENCOUNTER — LAB (OUTPATIENT)
Dept: LAB | Facility: CLINIC | Age: 33
End: 2023-06-07
Payer: COMMERCIAL

## 2023-06-07 ENCOUNTER — OFFICE VISIT (OUTPATIENT)
Dept: NEUROLOGY | Facility: CLINIC | Age: 33
End: 2023-06-07
Attending: OPHTHALMOLOGY
Payer: COMMERCIAL

## 2023-06-07 ENCOUNTER — PRE VISIT (OUTPATIENT)
Dept: NEUROLOGY | Facility: CLINIC | Age: 33
End: 2023-06-07

## 2023-06-07 VITALS
DIASTOLIC BLOOD PRESSURE: 82 MMHG | OXYGEN SATURATION: 98 % | BODY MASS INDEX: 27.98 KG/M2 | WEIGHT: 195 LBS | HEART RATE: 100 BPM | SYSTOLIC BLOOD PRESSURE: 122 MMHG

## 2023-06-07 DIAGNOSIS — G62.9 NEUROPATHY: ICD-10-CM

## 2023-06-07 DIAGNOSIS — G47.8 ABNORMAL REM SLEEP: ICD-10-CM

## 2023-06-07 DIAGNOSIS — G31.9 CEREBRAL ATROPHY (H): ICD-10-CM

## 2023-06-07 DIAGNOSIS — H54.3 VISION LOSS, BILATERAL: ICD-10-CM

## 2023-06-07 DIAGNOSIS — R27.8 TRUNCAL ATAXIA: ICD-10-CM

## 2023-06-07 DIAGNOSIS — E61.0 COPPER DEFICIENCY: ICD-10-CM

## 2023-06-07 DIAGNOSIS — G62.9 NEUROPATHY: Primary | ICD-10-CM

## 2023-06-07 DIAGNOSIS — Z87.898 H/O DIZZINESS: ICD-10-CM

## 2023-06-07 LAB — VIT B12 SERPL-MCNC: 410 PG/ML (ref 232–1245)

## 2023-06-07 PROCEDURE — 82525 ASSAY OF COPPER: CPT | Mod: 90 | Performed by: PATHOLOGY

## 2023-06-07 PROCEDURE — 99204 OFFICE O/P NEW MOD 45 MIN: CPT | Mod: GC | Performed by: PSYCHIATRY & NEUROLOGY

## 2023-06-07 PROCEDURE — 99000 SPECIMEN HANDLING OFFICE-LAB: CPT | Performed by: PATHOLOGY

## 2023-06-07 PROCEDURE — 84630 ASSAY OF ZINC: CPT | Mod: 90 | Performed by: PATHOLOGY

## 2023-06-07 PROCEDURE — 84425 ASSAY OF VITAMIN B-1: CPT | Mod: 90 | Performed by: PATHOLOGY

## 2023-06-07 PROCEDURE — 36415 COLL VENOUS BLD VENIPUNCTURE: CPT | Performed by: PATHOLOGY

## 2023-06-07 PROCEDURE — 82607 VITAMIN B-12: CPT | Mod: 90 | Performed by: PATHOLOGY

## 2023-06-07 RX ORDER — ARIPIPRAZOLE 20 MG/1
TABLET ORAL
COMMUNITY
Start: 2023-05-30 | End: 2024-08-14

## 2023-06-07 RX ORDER — MIRTAZAPINE 30 MG/1
TABLET, FILM COATED ORAL
COMMUNITY
Start: 2023-05-19 | End: 2024-08-14

## 2023-06-07 ASSESSMENT — PAIN SCALES - GENERAL: PAINLEVEL: NO PAIN (0)

## 2023-06-07 NOTE — LETTER
2023       RE: Vianey Whitaker  [address] [place] [facility] [place] [address]     Dear Colleague,    Thank you for referring your patient, Vianey Whitaker, to the [facility] [place] at [facility]. Please see a copy of my visit note below.      DEPARTMENT OF NEUROLOGY    Patient Name:  Vianey Whitaker  MRN:  [id number]    :  1990  Date of Clinic Visit:  2023  Primary Care Provider:  Edwige Coffey    Assessment:  Vianey Whitaker is a 32-year-old gentleman who presents to Neurology this afternoon for evaluation of balance issues.     #Truncal ataxia  #Diffuse cerebral and cerebellar atrophy  Exam today most remarkable for significant truncal ataxia with tandem walk.  This certainly may be related to his history of alcohol abuse, but he does have significant diffuse cerebral atrophy in addition to his cerebellar atrophy and further imaging with MRI is warranted.  It appears this imaging has already been ordered by his ophthalmologist so he was provided with the appropriate contact information to get this scheduled with our imaging centers.  - MRI brain (ordered by ophthalmology)    #Possible mild peripheral neuropathy  Hyperesthesia and tingling in feet per history. Exam with mildly reduced vibratory sensation and proprioception. Suspect mediated by alcohol toxicity but we will screen for nutritional deficiencies and metal toxicity.   - Check vitamins B1, B6, and B12 as well as a copper and zinc level    #Vision loss, bilateral, fluctuant  #Astigmatism  Not discussed at length during today's visit but will place referral requested by the patient's ophthalmologist.  - Referral placed to neuro-ophthalmology as outlined by prior ophthalmologist's recommendations    #Abnormal movements during sleep  #Snoring  Patient with a suspected family history of Parkinson's disease.  Per history, reports abnormal movements during sleep, sleep  "talking, and sleepwalking with occasional instances of waking up on the floor.  Patient would be best served by a in-lab PSG.  He also reports a history of snoring, rule out sleep apnea.  - Referral to sleep medicine     Patient has been seen with Dr. Soto who agrees with my assessment and plan.    Pradip Edwards MD  [facility] Department of Neurology PGY-3    This note consists of symbols derived from keyboarding, dictation and/or voice recognition software. As a result, there may be errors in the document that have gone undetected. Please consider this when interpreting information presented here within.      ------------------------------------------------------------------------------------------------------------    Chief Complaint: Balance issues      HPI: Mr. Whitaker is a 32-year-old gentleman with a history of alcohol use disorder in sustained remission who presents to Neurology for evaluation of balance issues. He explains that his issues with balance for started about 5 years ago or so.  Specifically, he reports having trouble with spatial awareness, noting that he will occasionally trip over his feet.  He does report feeling wobbly when he walks at times and has had a few falls and near falls.  He does endorse some tingling and sensitivity in his feet, which he believes actually started a year ago.  Along with that, he has noticed that his balance seems to be somewhat worse when he is in dark environments.  He feels like his balance is also worse when he is physically active, noting that he feels \"top-heavy.\"    In terms of his alcohol use history, he explains that he started drinking several years ago, with his most significant alcohol intake coming during the COVID pandemic, explaining that he felt that he was self-medicating for his depression.  During the height of his drinking, he reports that he was ingesting about a half a liter of hard alcohol per day.  His last drink was 3 months ago. " "    With respect to family history, he notes that his father has a history of Churg-Yelitza, as well as undiagnosed parkinsonism.  He notes that his father has not had any difficulties with cognition, however, nor has the patient had any subjective difficulties with memory or cognition.  His first cousin on the paternal side has a history of multiple sclerosis.    Of note, the patient reports that he does talk in his sleep and has sleepwalked before.  He also reports that he has been told that he does \"gymnastics\" during sleep and has woken up on the floor before.  He has been told that he snores.      ROS: 10-point review of systems was negative except for as noted above.     Vital signs:                         Estimated body mass index is 24.07 kg/m  as calculated from the following:    Height as of 3/14/23: 1.778 m (5' 10\").    Weight as of 3/14/23: 76.1 kg (167 lb 12.3 oz).      Examination:   -General: No acute distress.  -Neurological:   --MS: Patient is alert, attentive, and oriented. Speech is clear and fluent. Names normally. Remote memory intact.   --CNs: Pupils symmetric, round and reactive to light. Visual fields are full. Ocular motility is full.  There is central beating nystagmus with lateral gaze to the right.  Muscles of mastication and facial expression normal. Hearing intact to conversation. Palate elevation normal. Shoulder shrug is strong and symmetric. Tongue motions normal.   --Motor: Normal muscle bulk. Muscle strength is 5/5 and symmetric with testing of the deltoids, biceps, triceps, , hip flexors, knee flexors/extensors, and ankle dorsi- and plantarflexion.  --Reflexes: 2+ and symmetric at biceps, BR, triceps, patellae and Achilles. Toes are downgoing.  Saritha sign is absent.  --Sensory: Patient has mildly reduced vibratory sensation at the level of the toes with normalization at the level of the medial malleoli.  Some difficulty with proprioception in the distal lower extremities.  " Light touch, pinprick are normal.  Negative Romberg.  --Coordination: Heel-shin and finger-nose-finger are normal.  --Gait: Stands with feet normally spaced.  Casual gait is steady with normal arm swing bilaterally.  Significant difficulty with tandem walking, and to a lesser extent walking on heels.      INVESTIGATIONS:  All available and relevant labs, imaging, and other procedures were reviewed with the patient at this visit. Those of particular significance are listed below:    CT Head (3/12/2023): There is diffuse cerebral volume loss that is greater than expected for the patient's age as well as considerable volume loss in the cerebellum. Please see radiologist report for their impression.     Attestation signed by Ene Soto MD at 6/13/2023  4:08 PM:  I have met the patient and confirmed pertinent history and physical examination findings. I agree with Dr Edwards' documentation. My concern is the patient has neurological findings being attributed to alcohol consumption and that we are missing other contributing causes. He has a significant degree of atrophy on head imaging considering age.     At this time we will complete work up looking for other causes of sensory ataxia. He has a undiagnosed problem requiring work up. 5 labs ordered, 2 referrals placed. CT Head imaging reviewed.

## 2023-06-07 NOTE — PATIENT INSTRUCTIONS
- We will check some labs today to look for nutritional deficiencies and metal toxicity  - Referral to sleep medicine   - Please make sure to get your MRIs scheduled (see below for numbers to call)    Imaging Center Scheduling  Please contact us to schedule your imaging visit at any of our [facility] Locations  [facility]  (All Imaging Center locations)  Phone: [phone]    [facility]  ([facility], [facility], [facility])  Phone: [phone]    [facility]  ([facility], [facility])  Phone: [phone]    [facility]   Phone: [phone]

## 2023-06-08 LAB
COPPER SERPL-MCNC: 87.6 UG/DL
ZINC SERPL-MCNC: 57.9 UG/DL

## 2023-06-10 LAB — VIT B1 PYROPHOSHATE BLD-SCNC: 123 NMOL/L

## 2023-06-25 ENCOUNTER — ANCILLARY PROCEDURE (OUTPATIENT)
Dept: MRI IMAGING | Facility: CLINIC | Age: 33
End: 2023-06-25
Attending: OPHTHALMOLOGY
Payer: COMMERCIAL

## 2023-06-25 DIAGNOSIS — F10.10 ALCOHOL ABUSE: ICD-10-CM

## 2023-06-25 DIAGNOSIS — H53.19 OTHER SUBJECTIVE VISUAL DISTURBANCES: ICD-10-CM

## 2023-06-25 PROCEDURE — 70543 MRI ORBT/FAC/NCK W/O &W/DYE: CPT | Mod: GC | Performed by: STUDENT IN AN ORGANIZED HEALTH CARE EDUCATION/TRAINING PROGRAM

## 2023-06-25 PROCEDURE — 70553 MRI BRAIN STEM W/O & W/DYE: CPT | Mod: GC | Performed by: STUDENT IN AN ORGANIZED HEALTH CARE EDUCATION/TRAINING PROGRAM

## 2023-06-25 PROCEDURE — A9585 GADOBUTROL INJECTION: HCPCS | Mod: JZ | Performed by: STUDENT IN AN ORGANIZED HEALTH CARE EDUCATION/TRAINING PROGRAM

## 2023-06-25 RX ORDER — GADOBUTROL 604.72 MG/ML
10 INJECTION INTRAVENOUS ONCE
Status: COMPLETED | OUTPATIENT
Start: 2023-06-25 | End: 2023-06-25

## 2023-06-25 RX ADMIN — GADOBUTROL 9 ML: 604.72 INJECTION INTRAVENOUS at 07:30

## 2023-09-06 NOTE — PROGRESS NOTES
DEPARTMENT OF NEUROLOGY    Patient Name:  Vianey Whitaker  MRN:  [id number]    :  1990  Date of Clinic Visit:  2023  Primary Care Provider:  Edwige Coffey    Assessment:  Vianey Whitaker is a 32-year-old gentleman who returns to Neurology this afternoon for follow-up of his balance issues.  Since he was last seen on 2023, he was able to get his imaging and laboratory work-up completed for the most part.  His laboratory work-up was unrevealing with respect to causes for his potential neuropathy, which makes alcohol the most likely culprit.  His brain MRI re-demonstrated the significant atrophy appreciated on his head CT but otherwise was fairly unremarkable.  It is possible that his atrophy is related to alcohol alone, and while the degree of his atrophy seems excessive considering his age, I am at a loss for a better explanation.  With respect to his truncal ataxia, I do suspect that this is primarily related to his history of alcohol misuse given that it has been stable/improving following cessation of drinking.  It is considerably improved on his examination today relative to the last time I saw him in the clinic.  I would not expect a genetic or hereditary cause to improve like we are seeing in his case.  Having this in mind, my recommendation to him was to pursue physical therapy in the outpatient setting for his ataxia.  Should he find that his symptoms begin to trend in the wrong direction, he was instructed to reach back out and we could look into having him seen in the ataxia clinic.      Plan:  - Physical therapy for ataxia/balance training  - Patient to reach out with any new or worsening symptoms  - Sleep study as previously recommended  - Follow-up with neurology as needed going forward      Patient has been seen with Dr. Leggett who agrees with my assessment and plan.    Pradip Edawrds MD  [facility] Department of Neurology PGY-4    This note consists of symbols  derived from keyboarding, dictation and/or voice recognition software. As a result, there may be errors in the document that have gone undetected. Please consider this when interpreting information presented here within.    Outpatient General Neurology Staff  I have seen and evaluated the patient and discussed with the neurology resident on 9-7-23.  I supervised the resident during the visit and agree with the documentation.  Congratulated the patient on his sobriety and how per notes his balance appears to be significantly improving.  Suspect it will continue to improve with sobriety, PT and nutrition.  We discussed a multivitamin.  If he were to worsen or not further improve we would like him to return to clinic. He indicated that he understood.     In addition to face to face time I have also reviewed the patients chart, coordinated care, assisted in placing orders and documentation.  Total time (Face-to-Face, care coordination, orders, documentation) spent on 9-7-23 was greater than 40 minutes.     Martinez Leggett MD      ------------------------------------------------------------------------------------------------------------    Chief Complaint: Balance issues      HPI: Vianey Whitaker is a 32-year-old gentleman with a history of alcohol use disorder in sustained remission who returns to neurology for follow-up of his balance issues.  He was last seen for this concern on 6/7/2023, at which time we elected to pursue advanced imaging with MRI of the brain and send labs for potential causes of neuropathy.  Please see below under pertinent investigations for the results of this testing.    Interval History: Vianey presents with his aunt again this afternoon.  He states that his balance issues have been roughly the same if not a little bit better since he was last seen in June.  Fortunately, he has not had any falls since he was last seen.  He has managed to maintain his sobriety and actually just landed a job at the  "[employer] here at the [place].    Of note, patient states that he is unaware of whether his dream enactment became much more pronounced after starting the fluoxetine.    Also of note, patient did screen positive on the mental health questionnaire in the clinic.  He is interested in a referral to mental health.      Vital signs:      BP: 127/79 Pulse: 96     SpO2: 95 %     Height: 180.3 cm (5' 11\") (with shoes on) Weight: 93.6 kg (206 lb 4.8 oz) (with shoes and jacket on)  Estimated body mass index is 28.77 kg/m  as calculated from the following:    Height as of this encounter: 1.803 m (5' 11\").    Weight as of this encounter: 93.6 kg (206 lb 4.8 oz).      Examination:   -General: Sitting comfortably on the chair. No acute distress.  -HEENT: No skin discolorations noted. Head is normocephalic, atraumatic.   -Pulm: Normal work of breathing on RA.   -Abdomen: Non-distended.   -Musculoskeletal: No abnormalities noted on gross inspection.   -Neurological: Awake, alert and attentive.  Fully oriented.  EOMI with normal smooth pursuit and no concerning saccades.  Reflexes are 2+ in the biceps and brachioradialis, 3+ in bilateral patellae and Achilles.  Finger-to-nose and heel-to-shin are intact bilaterally.  Romberg is normal.  Casual gait is normal with narrow base and normal arm swing.  Mild truncal ataxia apparent with tandem walking.  He is able to walk on toes and heels without much difficulty.      INVESTIGATIONS:  All available and relevant labs, imaging, and other procedures were reviewed with the patient at this visit. Those of particular significance are listed below:    MRI Brain and Orbits w/wo contrast (6/25/2023):  Impression:  1. Bilateral relatively small caliber of the intraorbital optic nerves  with questionable T2 hyperintense signal in the midportion of the left  optic nerve without contrast enhancement. Findings may represent a  chronic process or sequela of a prior insult, causing optic " nerve  atrophy and gliosis.  2. Enhancing round focus with susceptibility artifacts in the right  frontal deep white matter, may represent a benign telangiectasia or  cavernoma. This is most likely an incidental finding and not related  to symptoms.    Labs from 6/7/2023:  - B1: 123  - B12: 410  - Copper: 87.6  - Zinc: 57.9

## 2023-09-07 ENCOUNTER — OFFICE VISIT (OUTPATIENT)
Dept: NEUROLOGY | Facility: CLINIC | Age: 33
End: 2023-09-07
Payer: COMMERCIAL

## 2023-09-07 VITALS
BODY MASS INDEX: 28.88 KG/M2 | SYSTOLIC BLOOD PRESSURE: 127 MMHG | WEIGHT: 206.3 LBS | HEART RATE: 96 BPM | DIASTOLIC BLOOD PRESSURE: 79 MMHG | HEIGHT: 71 IN | OXYGEN SATURATION: 95 %

## 2023-09-07 DIAGNOSIS — F10.21 ALCOHOL DEPENDENCE IN REMISSION (H): ICD-10-CM

## 2023-09-07 DIAGNOSIS — R27.8 TRUNCAL ATAXIA: Primary | ICD-10-CM

## 2023-09-07 DIAGNOSIS — F32.A DEPRESSION, UNSPECIFIED DEPRESSION TYPE: ICD-10-CM

## 2023-09-07 PROCEDURE — 99215 OFFICE O/P EST HI 40 MIN: CPT | Mod: GC | Performed by: STUDENT IN AN ORGANIZED HEALTH CARE EDUCATION/TRAINING PROGRAM

## 2023-09-07 ASSESSMENT — COLUMBIA-SUICIDE SEVERITY RATING SCALE - C-SSRS
6. HAVE YOU EVER DONE ANYTHING, STARTED TO DO ANYTHING, OR PREPARED TO DO ANYTHING TO END YOUR LIFE?: NO
1. WITHIN THE PAST MONTH, HAVE YOU WISHED YOU WERE DEAD OR WISHED YOU COULD GO TO SLEEP AND NOT WAKE UP?: NO
2. IN THE PAST MONTH, HAVE YOU ACTUALLY HAD ANY THOUGHTS OF KILLING YOURSELF?: NO
6. HAVE YOU EVER DONE ANYTHING, STARTED TO DO ANYTHING, OR PREPARED TO DO ANYTHING TO END YOUR LIFE?: NO
1. WITHIN THE PAST MONTH, HAVE YOU WISHED YOU WERE DEAD OR WISHED YOU COULD GO TO SLEEP AND NOT WAKE UP?: NO
5. IN THE PAST MONTH, HAVE YOU STARTED TO WORK OUT OR WORKED OUT THE DETAILS OF HOW TO KILL YOURSELF? DO YOU INTEND TO CARRY OUT THIS PLAN?: NO
5. IN THE PAST MONTH, HAVE YOU STARTED TO WORK OUT OR WORKED OUT THE DETAILS OF HOW TO KILL YOURSELF? DO YOU INTEND TO CARRY OUT THIS PLAN?: NO
3. IN THE PAST MONTH, HAVE YOU BEEN THINKING ABOUT HOW YOU MIGHT KILL YOURSELF?: NO
5. IN THE PAST MONTH, HAVE YOU STARTED TO WORK OUT OR WORKED OUT THE DETAILS OF HOW TO KILL YOURSELF? DO YOU INTEND TO CARRY OUT THIS PLAN?: NO
3. IN THE PAST MONTH, HAVE YOU BEEN THINKING ABOUT HOW YOU MIGHT KILL YOURSELF?: NO
5. IN THE PAST MONTH, HAVE YOU STARTED TO WORK OUT OR WORKED OUT THE DETAILS OF HOW TO KILL YOURSELF? DO YOU INTEND TO CARRY OUT THIS PLAN?: NO
2. IN THE PAST MONTH, HAVE YOU ACTUALLY HAD ANY THOUGHTS OF KILLING YOURSELF?: NO

## 2023-09-07 ASSESSMENT — PATIENT HEALTH QUESTIONNAIRE - PHQ9: SUM OF ALL RESPONSES TO PHQ QUESTIONS 1-9: 13

## 2023-09-07 ASSESSMENT — PAIN SCALES - GENERAL: PAINLEVEL: NO PAIN (0)

## 2023-09-07 NOTE — NURSING NOTE
Chief Complaint   Patient presents with    Follow Up     Neurology      Vitals were taken and medications were reconciled.   Joseph Crow, EMT  2:56 PM

## 2023-09-07 NOTE — LETTER
2023       RE: Vianey Whitaker  [address] [facility] [place] [address]     Dear Colleague,    Thank you for referring your patient, Vianey Whitaker, to the [facility] NEUROLOGY CLINIC [place] [facility]. Please see a copy of my visit note below.      DEPARTMENT OF NEUROLOGY    Patient Name:  Vianey Whitaker  MRN:  [id number]    :  1990  Date of Clinic Visit:  2023  Primary Care Provider:  Edwige Coffey    Assessment:  Vianey Whitaker is a 32-year-old gentleman who returns to Neurology this afternoon for follow-up of his balance issues.  Since he was last seen on 2023, he was able to get his imaging and laboratory work-up completed for the most part.  His laboratory work-up was unrevealing with respect to causes for his potential neuropathy, which makes alcohol the most likely culprit.  His brain MRI re-demonstrated the significant atrophy appreciated on his head CT but otherwise was fairly unremarkable.  It is possible that his atrophy is related to alcohol alone, and while the degree of his atrophy seems excessive considering his age, I am at a loss for a better explanation.  With respect to his truncal ataxia, I do suspect that this is primarily related to his history of alcohol misuse given that it has been stable/improving following cessation of drinking.  It is considerably improved on his examination today relative to the last time I saw him in the clinic.  I would not expect a genetic or hereditary cause to improve like we are seeing in his case.  Having this in mind, my recommendation to him was to pursue physical therapy in the outpatient setting for his ataxia.  Should he find that his symptoms begin to trend in the wrong direction, he was instructed to reach back out and we could look into having him seen in the ataxia clinic.      Plan:  - Physical therapy for ataxia/balance training  - Patient to reach out with  any new or worsening symptoms  - Sleep study as previously recommended  - Follow-up with neurology as needed going forward      Patient has been seen with Dr. Leggett who agrees with my assessment and plan.    Pradip Edwards MD  [facility] Department of Neurology PGY-4    This note consists of symbols derived from keyboarding, dictation and/or voice recognition software. As a result, there may be errors in the document that have gone undetected. Please consider this when interpreting information presented here within.    Outpatient General Neurology Staff  I have seen and evaluated the patient and discussed with the neurology resident on 9-7-23.  I supervised the resident during the visit and agree with the documentation.  Congratulated the patient on his sobriety and how per notes his balance appears to be significantly improving.  Suspect it will continue to improve with sobriety, PT and nutrition.  We discussed a multivitamin.  If he were to worsen or not further improve we would like him to return to clinic. He indicated that he understood.     In addition to face to face time I have also reviewed the patients chart, coordinated care, assisted in placing orders and documentation.  Total time (Face-to-Face, care coordination, orders, documentation) spent on 9-7-23 was greater than 40 minutes.     Martinez Leggett MD      ------------------------------------------------------------------------------------------------------------    Chief Complaint: Balance issues      HPI: Vianey Whitaker is a 32-year-old gentleman with a history of alcohol use disorder in sustained remission who returns to neurology for follow-up of his balance issues.  He was last seen for this concern on 6/7/2023, at which time we elected to pursue advanced imaging with MRI of the brain and send labs for potential causes of neuropathy.  Please see below under pertinent investigations for the results of this testing.    Interval History:  "Vianey presents with his aunt again this afternoon.  He states that his balance issues have been roughly the same if not a little bit better since he was last seen in June.  Fortunately, he has not had any falls since he was last seen.  He has managed to maintain his sobriety and actually just landed a job at the [employer] here at the [place].    Of note, patient states that he is unaware of whether his dream enactment became much more pronounced after starting the fluoxetine.    Also of note, patient did screen positive on the mental health questionnaire in the clinic.  He is interested in a referral to mental health.      Vital signs:      BP: 127/79 Pulse: 96     SpO2: 95 %     Height: 180.3 cm (5' 11\") (with shoes on) Weight: 93.6 kg (206 lb 4.8 oz) (with shoes and jacket on)  Estimated body mass index is 28.77 kg/m  as calculated from the following:    Height as of this encounter: 1.803 m (5' 11\").    Weight as of this encounter: 93.6 kg (206 lb 4.8 oz).      Examination:   -General: Sitting comfortably on the chair. No acute distress.  -HEENT: No skin discolorations noted. Head is normocephalic, atraumatic.   -Pulm: Normal work of breathing on RA.   -Abdomen: Non-distended.   -Musculoskeletal: No abnormalities noted on gross inspection.   -Neurological: Awake, alert and attentive.  Fully oriented.  EOMI with normal smooth pursuit and no concerning saccades.  Reflexes are 2+ in the biceps and brachioradialis, 3+ in bilateral patellae and Achilles.  Finger-to-nose and heel-to-shin are intact bilaterally.  Romberg is normal.  Casual gait is normal with narrow base and normal arm swing.  Mild truncal ataxia apparent with tandem walking.  He is able to walk on toes and heels without much difficulty.      INVESTIGATIONS:  All available and relevant labs, imaging, and other procedures were reviewed with the patient at this visit. Those of particular significance are listed below:    MRI Brain and Orbits w/wo " contrast (6/25/2023):  Impression:  1. Bilateral relatively small caliber of the intraorbital optic nerves  with questionable T2 hyperintense signal in the midportion of the left  optic nerve without contrast enhancement. Findings may represent a  chronic process or sequela of a prior insult, causing optic nerve  atrophy and gliosis.  2. Enhancing round focus with susceptibility artifacts in the right  frontal deep white matter, may represent a benign telangiectasia or  cavernoma. This is most likely an incidental finding and not related  to symptoms.    Labs from 6/7/2023:  - B1: 123  - B12: 410  - Copper: 87.6  - Zinc: 57.9              Again, thank you for allowing me to participate in the care of your patient.      Sincerely,    Serjio Edwards MD

## 2023-09-07 NOTE — NURSING NOTE
Met pt and Chaya.  Pt lives alone and is safe at home.  He does not have any firearms. He does have fleeting thought of harming himself but not today. Pt is in treatment for Mental Health for addiction at [facility] and meets 3x a week.  He stated he does not have a suicide plan.  He states he wants a referral to a mental health therapist.  He is taking ability, prosac, and mirtazapine. Informed pt if he has thoughts of suicide or harming himself without a plan, then he needs to contact his mental health provider.  If he has thoughts of suicide or harming himself with a plan this is a mental health emergency and go the ER at a hospital and he verbally understood.  Pt was given a handout of mental health resources.

## 2023-09-07 NOTE — NURSING NOTE
Depression Screening Follow-up        9/7/2023     2:56 PM   PHQ   PHQ-9 Total Score 13   Q9: Thoughts of better off dead/self-harm past 2 weeks Several days       {       9/7/2023     3:37 PM   C-SSRS (Brief Swift)   Within the last month, have you wished you were dead or wished you could go to sleep and not wake up? No   Within the last month, have you had any actual thoughts of killing yourself? No   Within the last month, have you been thinking about how you might do this? No   Within the last month, have you had these thoughts and had some intention of acting on them? No   Within the last month, have you started to work out or worked out the details of how to kill yourself with the intent to carry out this plan? No   Within the last month, have you ever done anything, started to do anything, or prepared to do anything to end your life? No         Follow Up         Crisis resource information provided in the After Visit Summary  Pended mental health referral and notified provider    Libertad Kelly RN

## 2023-09-07 NOTE — OUTPATIENT NURSE NOTE
"Pt stated he lives along and is safe at home.  No firearms. He stated he is currently in treatment for addiction at [facility] and he meets 3x a week.  Pt states he does not have a suicide plan but has \"fleeting thought of harming himself\" but none of these thoughts today.  He is currently taking prosac, abilify, and mirtazapine.  Informed pt if he has thoughts of harming himself without a plan, he is contact a MH provider.  If he has thoughts of harming himself or others and has a plan then this a MH emergency and he is to to the ER at a hospital and he understood.  PT is requesting a referral to a MH therapist.   "

## 2023-09-08 PROBLEM — F10.21 ALCOHOL DEPENDENCE IN REMISSION (H): Status: ACTIVE | Noted: 2023-09-08

## 2024-08-04 ENCOUNTER — HEALTH MAINTENANCE LETTER (OUTPATIENT)
Age: 34
End: 2024-08-04

## 2024-08-14 ENCOUNTER — OFFICE VISIT (OUTPATIENT)
Dept: FAMILY MEDICINE | Facility: CLINIC | Age: 34
End: 2024-08-14
Payer: COMMERCIAL

## 2024-08-14 VITALS
DIASTOLIC BLOOD PRESSURE: 78 MMHG | WEIGHT: 169 LBS | SYSTOLIC BLOOD PRESSURE: 115 MMHG | HEART RATE: 100 BPM | TEMPERATURE: 97.5 F | RESPIRATION RATE: 16 BRPM | OXYGEN SATURATION: 95 % | HEIGHT: 71 IN | BODY MASS INDEX: 23.66 KG/M2

## 2024-08-14 DIAGNOSIS — F10.21 ALCOHOL DEPENDENCE IN REMISSION (H): ICD-10-CM

## 2024-08-14 DIAGNOSIS — Z59.41 FOOD INSECURITY: ICD-10-CM

## 2024-08-14 DIAGNOSIS — Z00.00 ENCOUNTER FOR ANNUAL PHYSICAL EXAM: Primary | ICD-10-CM

## 2024-08-14 DIAGNOSIS — Z23 ENCOUNTER FOR IMMUNIZATION: ICD-10-CM

## 2024-08-14 DIAGNOSIS — Z13.6 SCREENING FOR HEART DISEASE: ICD-10-CM

## 2024-08-14 LAB
ALBUMIN SERPL BCG-MCNC: 4.6 G/DL (ref 3.5–5.2)
ALP SERPL-CCNC: 52 U/L (ref 40–150)
ALT SERPL W P-5'-P-CCNC: 42 U/L (ref 0–70)
ANION GAP SERPL CALCULATED.3IONS-SCNC: 9 MMOL/L (ref 7–15)
AST SERPL W P-5'-P-CCNC: 25 U/L (ref 0–45)
BASOPHILS # BLD AUTO: 0 10E3/UL (ref 0–0.2)
BASOPHILS NFR BLD AUTO: 1 %
BILIRUB SERPL-MCNC: 0.4 MG/DL
BUN SERPL-MCNC: 11.6 MG/DL (ref 6–20)
CALCIUM SERPL-MCNC: 9.5 MG/DL (ref 8.8–10.4)
CHLORIDE SERPL-SCNC: 102 MMOL/L (ref 98–107)
CREAT SERPL-MCNC: 0.91 MG/DL (ref 0.67–1.17)
EGFRCR SERPLBLD CKD-EPI 2021: >90 ML/MIN/1.73M2
EOSINOPHIL # BLD AUTO: 0.2 10E3/UL (ref 0–0.7)
EOSINOPHIL NFR BLD AUTO: 3 %
ERYTHROCYTE [DISTWIDTH] IN BLOOD BY AUTOMATED COUNT: 11.6 % (ref 10–15)
GLUCOSE SERPL-MCNC: 96 MG/DL (ref 70–99)
HCO3 SERPL-SCNC: 29 MMOL/L (ref 22–29)
HCT VFR BLD AUTO: 45.8 % (ref 40–53)
HGB BLD-MCNC: 15.6 G/DL (ref 13.3–17.7)
IMM GRANULOCYTES # BLD: 0 10E3/UL
IMM GRANULOCYTES NFR BLD: 0 %
LYMPHOCYTES # BLD AUTO: 1.6 10E3/UL (ref 0.8–5.3)
LYMPHOCYTES NFR BLD AUTO: 24 %
MCH RBC QN AUTO: 29.8 PG (ref 26.5–33)
MCHC RBC AUTO-ENTMCNC: 34.1 G/DL (ref 31.5–36.5)
MCV RBC AUTO: 87 FL (ref 78–100)
MONOCYTES # BLD AUTO: 0.9 10E3/UL (ref 0–1.3)
MONOCYTES NFR BLD AUTO: 13 %
NEUTROPHILS # BLD AUTO: 3.9 10E3/UL (ref 1.6–8.3)
NEUTROPHILS NFR BLD AUTO: 60 %
PLATELET # BLD AUTO: 413 10E3/UL (ref 150–450)
POTASSIUM SERPL-SCNC: 4.4 MMOL/L (ref 3.4–5.3)
PROT SERPL-MCNC: 7.1 G/DL (ref 6.4–8.3)
RBC # BLD AUTO: 5.24 10E6/UL (ref 4.4–5.9)
SODIUM SERPL-SCNC: 140 MMOL/L (ref 135–145)
WBC # BLD AUTO: 6.6 10E3/UL (ref 4–11)

## 2024-08-14 PROCEDURE — 90651 9VHPV VACCINE 2/3 DOSE IM: CPT

## 2024-08-14 PROCEDURE — 85025 COMPLETE CBC W/AUTO DIFF WBC: CPT

## 2024-08-14 PROCEDURE — 36415 COLL VENOUS BLD VENIPUNCTURE: CPT

## 2024-08-14 PROCEDURE — 90471 IMMUNIZATION ADMIN: CPT

## 2024-08-14 PROCEDURE — 90746 HEPB VACCINE 3 DOSE ADULT IM: CPT

## 2024-08-14 PROCEDURE — 90472 IMMUNIZATION ADMIN EACH ADD: CPT

## 2024-08-14 PROCEDURE — 80053 COMPREHEN METABOLIC PANEL: CPT

## 2024-08-14 PROCEDURE — 99385 PREV VISIT NEW AGE 18-39: CPT | Mod: 25

## 2024-08-14 PROCEDURE — 90677 PCV20 VACCINE IM: CPT

## 2024-08-14 RX ORDER — CIPROFLOXACIN AND DEXAMETHASONE 3; 1 MG/ML; MG/ML
4 SUSPENSION/ DROPS AURICULAR (OTIC) 2 TIMES DAILY
COMMUNITY

## 2024-08-14 SDOH — HEALTH STABILITY: PHYSICAL HEALTH: ON AVERAGE, HOW MANY DAYS PER WEEK DO YOU ENGAGE IN MODERATE TO STRENUOUS EXERCISE (LIKE A BRISK WALK)?: 3 DAYS

## 2024-08-14 SDOH — ECONOMIC STABILITY - FOOD INSECURITY: FOOD INSECURITY: Z59.41

## 2024-08-14 SDOH — HEALTH STABILITY: PHYSICAL HEALTH: ON AVERAGE, HOW MANY MINUTES DO YOU ENGAGE IN EXERCISE AT THIS LEVEL?: 60 MIN

## 2024-08-14 ASSESSMENT — PAIN SCALES - GENERAL: PAINLEVEL: NO PAIN (0)

## 2024-08-14 ASSESSMENT — SOCIAL DETERMINANTS OF HEALTH (SDOH): HOW OFTEN DO YOU GET TOGETHER WITH FRIENDS OR RELATIVES?: ONCE A WEEK

## 2024-08-14 NOTE — PROGRESS NOTES
Preventive Care Visit  [facility] INTEGRATED PRIMARY CARE  Jorge Stanley NP, Nurse Practitioner Primary Care  Aug 14, 2024      Assessment & Plan     Food insecurity  - Primary Care - Care Coordination Referral; Future  Food access is irregular. Worries he is not eating enough. CC referral placed (consider nutritionist reach out).    Alcohol dependence in remission (H)  - Comprehensive metabolic panel (BMP + Alb, Alk Phos, ALT, AST, Total. Bili, TP); Future  - CBC with platelets and differential; Future  - Comprehensive metabolic panel (BMP + Alb, Alk Phos, ALT, AST, Total. Bili, TP)  - CBC with platelets and differential  Sober 18 months. Sobriety going well. Will recheck LFTs. Will check CBC considering baseline neuropathy    Encounter for annual physical exam  Discussed exercise goals with patient today, including 150 min/week regular activity    Encounter for immunization  - HEPATITIS B, ADULT 20+ (ENGERIX-B/RECOMBIVAX HB)  - Pneumococcal 20 Valent Conjugate (Prevnar 20)  - HPV9 (GARDASIL 9)    Patient has been advised of split billing requirements and indicates understanding: Yes          Subjective   Vianey is a 33 year old, presenting for the following:  Physical and Ear Problem (Pt states he's had an ear infection in left ear for about 2 weeks now. Went to urgent care a week ago is still taking antibiotics.)      Occupation: works in coffee shop- , sandwiches.  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  Single. No concern for STDs.  Foods: Will make sandwiches- deli, cheeses. Concerns about not eating enough. Some weight loss (food scarcity).  Exercise: Was going to gym 1-2x/week. Difficult to access gym.  Sleep: ok. No concerns.  Dental: needs to go.  Eye:  went one month ago.    18 months - sobriety from alcohol.      8/14/2024     2:20 PM   Additional Questions   Roomed by Ofe   Accompanied by Self        Ear Problem            8/14/2024   General Health   How would you rate your overall physical  health? Good   Feel stress (tense, anxious, or unable to sleep) To some extent      (!) STRESS CONCERN- Has good resources. Therapist on a regular basis and arms worker and sponsor ([facility])      8/14/2024   Nutrition   Three or more servings of calcium each day? Yes   Diet: Regular (no restrictions)   How many servings of fruit and vegetables per day? (!) 0-1   How many sweetened beverages each day? 0-1            8/14/2024   Exercise   Days per week of moderate/strenous exercise 3 days   Average minutes spent exercising at this level 60 min            8/14/2024   Social Factors   Frequency of gathering with friends or relatives Once a week   Worry food won't last until get money to buy more Yes   Food not last or not have enough money for food? Yes   Do you have housing? (Housing is defined as stable permanent housing and does not include staying ouside in a car, in a tent, in an abandoned building, in an overnight shelter, or couch-surfing.) Yes   Are you worried about losing your housing? No   Lack of transportation? No   Unable to get utilities (heat,electricity)? No      (!) FOOD SECURITY CONCERN PRESENT      8/14/2024   Dental   Dentist two times every year? (!) NO- needs to do still.            MH: Feels that it is certainly manageable. They had positive and negatives with previous meds.  Today's PHQ-2 Score:       8/14/2024     2:03 PM   PHQ-2 ( 1999 Pf[employer])   Q1: Little interest or pleasure in doing things 1   Q2: Feeling down, depressed or hopeless 1   PHQ-2 Score 2   Q1: Little interest or pleasure in doing things Several days   Q2: Feeling down, depressed or hopeless Several days   PHQ-2 Score 2           8/14/2024   Substance Use   Alcohol more than 3/day or more than 7/wk Not Applicable   Do you use any other substances recreationally? No        Social History     Tobacco Use    Smoking status: Never    Smokeless tobacco: Never   Substance Use Topics    Alcohol use: Yes     Alcohol/week: 7.0 - 8.0 standard  "drinks of alcohol     Types: 7 - 8 Shots of liquor per week     Comment: 2 full glasses of whiseky every day    Drug use: Never             8/14/2024   One time HIV Screening   Previous HIV test? I don't know          8/14/2024   STI Screening   New sexual partner(s) since last STI/HIV test? No            8/14/2024   Contraception/Family Planning   Questions about contraception or family planning No           Reviewed and updated as needed this visit by Provider                       Objective    Exam  /78   Pulse 100   Temp 97.5  F (36.4  C) (Temporal)   Resp 16   Ht 1.803 m (5' 11\")   Wt 76.7 kg (169 lb)   SpO2 95%   BMI 23.57 kg/m     Estimated body mass index is 23.57 kg/m  as calculated from the following:    Height as of this encounter: 1.803 m (5' 11\").    Weight as of this encounter: 76.7 kg (169 lb).    Physical Exam  GENERAL: alert and no distress  EYES: Eyes grossly normal to inspection, PERRL and conjunctivae and sclerae normal  HENT: No canal or TM erythema or bulging. ear canals and TM's normal, nose and mouth without ulcers or lesions  NECK: no adenopathy, no asymmetry, masses, or scars  RESP: lungs clear to auscultation - no rales, rhonchi or wheezes  CV: regular rate and rhythm, normal S1 S2, no S3 or S4, no murmur, click or rub, no peripheral edema  ABDOMEN: soft, nontender, no hepatosplenomegaly, no masses and bowel sounds normal  MS: no gross musculoskeletal defects noted, no edema  SKIN: no suspicious lesions or rashes  NEURO: Normal strength and tone, mentation intact and speech normal  EXTREMITIES: DP pulses 2+ bilaterally. Feet warm.  PSYCH: mentation appears normal, affect normal/bright        Signed Electronically by: Jorge Stanley NP    "

## 2024-08-15 ENCOUNTER — PATIENT OUTREACH (OUTPATIENT)
Dept: CARE COORDINATION | Facility: CLINIC | Age: 34
End: 2024-08-15
Payer: COMMERCIAL

## 2024-08-15 NOTE — PROGRESS NOTES
Clinic Care Coordination Contact  [facility]/Voicemail    Chart Review: PCP referral from Jorge Stanley CNP, on 8/14/24:  Financial Support - food      Clinical Data: Care Coordinator Outreach    Outreach Documentation Number of Outreach Attempt   8/15/2024   9:17 AM 1       Spoke to pt this morning. He is unable to talk due to being at work. Initially, requested CHW call him at 5:00 pm today. After a brief conversation, it was determined CHW could call pt tomorrow morning, anytime before noon.     Plan:  CHW will attempt to contact pt tomorrow, 8/16/24, in the morning, per pt request.    Marcela Schneider  Community Health Worker  [facility]  [phone]

## 2024-08-15 NOTE — LETTER
[facility] CARE COORDINATION  [address],   [place] [address]    August 16, 2024    Vianey Whitaker  [address] [facility] [address]      Dear Vianey,    I am a clinic community health worker who works with Jorge Stanley CNP,  with the [facility]. I wanted to introduce myself and provide you with my contact information for you to be able to call me with any questions or concerns. Below is a description of clinic care coordination and how I can further assist you.       The clinic care coordination team is made up of a registered nurse, , financial resource worker and community health worker who understand the health care system. The goal of clinic care coordination is to help you manage your health and improve access to the health care system. Our team works alongside your provider to assist you in determining your health and social needs. We can help you obtain health care and community resources, providing you with necessary information and education. We can work with you through any barriers and develop a care plan that helps coordinate and strengthen the communication between you and your care team.  Our services are voluntary and are offered without charge to you personally.    Please feel free to contact me with any questions or concerns regarding care coordination and what we can offer.      We are focused on providing you with the highest-quality healthcare experience possible.      Sincerely,     Marcela Schneider  Community Health Worker  [facility]  [phone]

## 2024-08-16 NOTE — PROGRESS NOTES
Clinic Care Coordination Contact  [facility]/Voicemail    Chart Review: PCP referral from Jorge Stanley CNP, on 8/14/24:  Financial Support - food      Clinical Data: Care Coordinator Outreach    Outreach Documentation Number of Outreach Attempt   8/15/2024   9:17 AM 1   8/16/2024   9:38 AM 2       Left message on patient's voicemail with call back information and requested return call.    Sent referral to [employer] Food Resource Navigator, to place a call to pt.     Plan: Care Coordinator will send care coordination introduction letter with care coordinator contact information and explanation of care coordination services [facility]. Care Coordinator will do no further outreaches at this time.    Marcela Schneider  Community Health Worker  [facility]  [phone]